# Patient Record
Sex: FEMALE | Race: BLACK OR AFRICAN AMERICAN | NOT HISPANIC OR LATINO | Employment: STUDENT | URBAN - METROPOLITAN AREA
[De-identification: names, ages, dates, MRNs, and addresses within clinical notes are randomized per-mention and may not be internally consistent; named-entity substitution may affect disease eponyms.]

---
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
## 2025-02-03 ENCOUNTER — OFFICE VISIT (OUTPATIENT)
Dept: FAMILY MEDICINE | Facility: CLINIC | Age: 20
End: 2025-02-03
Payer: COMMERCIAL

## 2025-02-03 VITALS
OXYGEN SATURATION: 99 % | TEMPERATURE: 96.9 F | HEART RATE: 72 BPM | HEIGHT: 68 IN | BODY MASS INDEX: 36.53 KG/M2 | RESPIRATION RATE: 16 BRPM | DIASTOLIC BLOOD PRESSURE: 78 MMHG | WEIGHT: 241 LBS | SYSTOLIC BLOOD PRESSURE: 118 MMHG

## 2025-02-03 DIAGNOSIS — R41.840 CONCENTRATION DEFICIT: Primary | ICD-10-CM

## 2025-02-03 LAB
ERYTHROCYTE [DISTWIDTH] IN BLOOD BY AUTOMATED COUNT: 12.6 % (ref 10–15)
HCT VFR BLD AUTO: 38.7 % (ref 35–47)
HGB BLD-MCNC: 13.2 G/DL (ref 11.7–15.7)
MCH RBC QN AUTO: 29.1 PG (ref 26.5–33)
MCHC RBC AUTO-ENTMCNC: 34.1 G/DL (ref 31.5–36.5)
MCV RBC AUTO: 85 FL (ref 78–100)
PLATELET # BLD AUTO: 282 10E3/UL (ref 150–450)
RBC # BLD AUTO: 4.54 10E6/UL (ref 3.8–5.2)
WBC # BLD AUTO: 5 10E3/UL (ref 4–11)

## 2025-02-03 PROCEDURE — 82728 ASSAY OF FERRITIN: CPT | Performed by: FAMILY MEDICINE

## 2025-02-03 PROCEDURE — 36415 COLL VENOUS BLD VENIPUNCTURE: CPT | Performed by: FAMILY MEDICINE

## 2025-02-03 PROCEDURE — 3074F SYST BP LT 130 MM HG: CPT | Performed by: FAMILY MEDICINE

## 2025-02-03 PROCEDURE — 82607 VITAMIN B-12: CPT | Performed by: FAMILY MEDICINE

## 2025-02-03 PROCEDURE — 3078F DIAST BP <80 MM HG: CPT | Performed by: FAMILY MEDICINE

## 2025-02-03 PROCEDURE — 85027 COMPLETE CBC AUTOMATED: CPT | Performed by: FAMILY MEDICINE

## 2025-02-03 PROCEDURE — 99204 OFFICE O/P NEW MOD 45 MIN: CPT | Performed by: FAMILY MEDICINE

## 2025-02-03 PROCEDURE — 84443 ASSAY THYROID STIM HORMONE: CPT | Performed by: FAMILY MEDICINE

## 2025-02-03 PROCEDURE — 1126F AMNT PAIN NOTED NONE PRSNT: CPT | Performed by: FAMILY MEDICINE

## 2025-02-03 PROCEDURE — 82306 VITAMIN D 25 HYDROXY: CPT | Performed by: FAMILY MEDICINE

## 2025-02-03 PROCEDURE — 80048 BASIC METABOLIC PNL TOTAL CA: CPT | Performed by: FAMILY MEDICINE

## 2025-02-03 ASSESSMENT — ANXIETY QUESTIONNAIRES
GAD7 TOTAL SCORE: 10
6. BECOMING EASILY ANNOYED OR IRRITABLE: SEVERAL DAYS
GAD7 TOTAL SCORE: 10
IF YOU CHECKED OFF ANY PROBLEMS ON THIS QUESTIONNAIRE, HOW DIFFICULT HAVE THESE PROBLEMS MADE IT FOR YOU TO DO YOUR WORK, TAKE CARE OF THINGS AT HOME, OR GET ALONG WITH OTHER PEOPLE: EXTREMELY DIFFICULT
5. BEING SO RESTLESS THAT IT IS HARD TO SIT STILL: NOT AT ALL
1. FEELING NERVOUS, ANXIOUS, OR ON EDGE: MORE THAN HALF THE DAYS
2. NOT BEING ABLE TO STOP OR CONTROL WORRYING: NEARLY EVERY DAY
7. FEELING AFRAID AS IF SOMETHING AWFUL MIGHT HAPPEN: NOT AT ALL
3. WORRYING TOO MUCH ABOUT DIFFERENT THINGS: NEARLY EVERY DAY

## 2025-02-03 ASSESSMENT — PATIENT HEALTH QUESTIONNAIRE - PHQ9
SUM OF ALL RESPONSES TO PHQ QUESTIONS 1-9: 5
5. POOR APPETITE OR OVEREATING: SEVERAL DAYS

## 2025-02-03 ASSESSMENT — PAIN SCALES - GENERAL: PAINLEVEL_OUTOF10: NO PAIN (0)

## 2025-02-03 NOTE — PROGRESS NOTES
"  Assessment & Plan     Concentration deficit  Patient is new to the practice.  She reports having difficulty with concentration for which reason she had to quit school.  She is working as a assistant to a nurse  - Adult Mental Health  Referral; Future  - CBC with platelets; Future  - TSH with free T4 reflex; Future  - Basic metabolic panel; Future  - Ferritin; Future  - Vitamin D Deficiency; Future  - Vitamin B12; Future  - CBC with platelets  - TSH with free T4 reflex  - Basic metabolic panel  - Ferritin  - Vitamin D Deficiency  - Vitamin B12          BMI  Estimated body mass index is 36.53 kg/m  as calculated from the following:    Height as of this encounter: 1.73 m (5' 8.11\").    Weight as of this encounter: 109.3 kg (241 lb).             Jacy Andrade is a 19 year old, presenting for the following health issues:  Referral (Psychiatrist )        2/3/2025     4:52 PM   Additional Questions   Roomed by Marie MCQUEEN     History of Present Illness       Reason for visit:  A referl   She is taking medications regularly.     Patient would like a referral to a psychiatrist for evaluation of ADHD.              Review of Systems  CONSTITUTIONAL: NEGATIVE for fever, chills, change in weight  ENT/MOUTH: NEGATIVE for ear, mouth and throat problems  RESP: NEGATIVE for significant cough or SOB  CV: NEGATIVE for chest pain, palpitations or peripheral edema      Objective    /78 (BP Location: Right arm, Patient Position: Sitting, Cuff Size: Adult Large)   Pulse 72   Temp 96.9  F (36.1  C) (Temporal)   Resp 16   Ht 1.73 m (5' 8.11\")   Wt 109.3 kg (241 lb)   LMP 01/31/2025 (Approximate)   SpO2 99%   BMI 36.53 kg/m    Body mass index is 36.53 kg/m .  Physical Exam   GENERAL: alert and no distress  RESP: lungs clear to auscultation - no rales, rhonchi or wheezes  CV: regular rate and rhythm, normal S1 S2, no S3 or S4, no murmur, click or rub, no peripheral edema  PSYCH: mentation appears normal, affect " normal/bright            Signed Electronically by: Brendan Zacarias MD  {Email feedback regarding this note to [email or web address]   :[id number]}

## 2025-02-04 LAB
ANION GAP SERPL CALCULATED.3IONS-SCNC: 15 MMOL/L (ref 7–15)
BUN SERPL-MCNC: 16.7 MG/DL (ref 6–20)
CALCIUM SERPL-MCNC: 9.6 MG/DL (ref 8.8–10.4)
CHLORIDE SERPL-SCNC: 104 MMOL/L (ref 98–107)
CREAT SERPL-MCNC: 0.55 MG/DL (ref 0.51–0.95)
EGFRCR SERPLBLD CKD-EPI 2021: >90 ML/MIN/1.73M2
FERRITIN SERPL-MCNC: 19 NG/ML (ref 6–175)
GLUCOSE SERPL-MCNC: 122 MG/DL (ref 70–99)
HCO3 SERPL-SCNC: 19 MMOL/L (ref 22–29)
POTASSIUM SERPL-SCNC: 3.9 MMOL/L (ref 3.4–5.3)
SODIUM SERPL-SCNC: 138 MMOL/L (ref 135–145)
TSH SERPL DL<=0.005 MIU/L-ACNC: 0.61 UIU/ML (ref 0.3–4.2)
VIT B12 SERPL-MCNC: 624 PG/ML (ref 232–1245)
VIT D+METAB SERPL-MCNC: 16 NG/ML (ref 20–50)

## 2025-02-18 ASSESSMENT — PATIENT HEALTH QUESTIONNAIRE - PHQ9
10. IF YOU CHECKED OFF ANY PROBLEMS, HOW DIFFICULT HAVE THESE PROBLEMS MADE IT FOR YOU TO DO YOUR WORK, TAKE CARE OF THINGS AT HOME, OR GET ALONG WITH OTHER PEOPLE: SOMEWHAT DIFFICULT
SUM OF ALL RESPONSES TO PHQ QUESTIONS 1-9: 11
SUM OF ALL RESPONSES TO PHQ QUESTIONS 1-9: 11

## 2025-02-18 ASSESSMENT — ANXIETY QUESTIONNAIRES
IF YOU CHECKED OFF ANY PROBLEMS ON THIS QUESTIONNAIRE, HOW DIFFICULT HAVE THESE PROBLEMS MADE IT FOR YOU TO DO YOUR WORK, TAKE CARE OF THINGS AT HOME, OR GET ALONG WITH OTHER PEOPLE: SOMEWHAT DIFFICULT
GAD7 TOTAL SCORE: 7
1. FEELING NERVOUS, ANXIOUS, OR ON EDGE: SEVERAL DAYS
GAD7 TOTAL SCORE: 7
5. BEING SO RESTLESS THAT IT IS HARD TO SIT STILL: NOT AT ALL
7. FEELING AFRAID AS IF SOMETHING AWFUL MIGHT HAPPEN: NOT AT ALL
GAD7 TOTAL SCORE: 7
7. FEELING AFRAID AS IF SOMETHING AWFUL MIGHT HAPPEN: NOT AT ALL
2. NOT BEING ABLE TO STOP OR CONTROL WORRYING: MORE THAN HALF THE DAYS
4. TROUBLE RELAXING: SEVERAL DAYS
8. IF YOU CHECKED OFF ANY PROBLEMS, HOW DIFFICULT HAVE THESE MADE IT FOR YOU TO DO YOUR WORK, TAKE CARE OF THINGS AT HOME, OR GET ALONG WITH OTHER PEOPLE?: SOMEWHAT DIFFICULT
6. BECOMING EASILY ANNOYED OR IRRITABLE: SEVERAL DAYS
3. WORRYING TOO MUCH ABOUT DIFFERENT THINGS: MORE THAN HALF THE DAYS

## 2025-02-19 ENCOUNTER — VIRTUAL VISIT (OUTPATIENT)
Dept: PSYCHOLOGY | Facility: CLINIC | Age: 20
End: 2025-02-19
Payer: COMMERCIAL

## 2025-02-19 ENCOUNTER — FCC EXTENDED DOCUMENTATION (OUTPATIENT)
Dept: PSYCHOLOGY | Facility: CLINIC | Age: 20
End: 2025-02-19
Payer: COMMERCIAL

## 2025-02-19 DIAGNOSIS — R41.840 INATTENTION: Primary | ICD-10-CM

## 2025-02-19 DIAGNOSIS — R41.840 CONCENTRATION DEFICIT: ICD-10-CM

## 2025-02-19 PROCEDURE — 90837 PSYTX W PT 60 MINUTES: CPT | Mod: 95 | Performed by: PSYCHOLOGIST

## 2025-02-19 ASSESSMENT — COLUMBIA-SUICIDE SEVERITY RATING SCALE - C-SSRS
TOTAL  NUMBER OF ABORTED OR SELF INTERRUPTED ATTEMPTS LIFETIME: NO
1. HAVE YOU WISHED YOU WERE DEAD OR WISHED YOU COULD GO TO SLEEP AND NOT WAKE UP?: YES
1. IN THE PAST MONTH, HAVE YOU WISHED YOU WERE DEAD OR WISHED YOU COULD GO TO SLEEP AND NOT WAKE UP?: NO
ATTEMPT LIFETIME: NO
2. HAVE YOU ACTUALLY HAD ANY THOUGHTS OF KILLING YOURSELF?: NO
6. HAVE YOU EVER DONE ANYTHING, STARTED TO DO ANYTHING, OR PREPARED TO DO ANYTHING TO END YOUR LIFE?: NO
REASONS FOR IDEATION LIFETIME: COMPLETELY TO END OR STOP THE PAIN (YOU COULDN'T GO ON LIVING WITH THE PAIN OR HOW YOU WERE FEELING)
TOTAL  NUMBER OF INTERRUPTED ATTEMPTS LIFETIME: NO

## 2025-02-19 NOTE — PROGRESS NOTES
[facility]   Mental Health & Addiction Services     Progress Note - Initial Visit    Patient  Name:  Lupe Pham Date: 2025         Service Type: Individual     Visit Start Time: 9:00am  Visit End Time: 9:53am    Visit #: 1    Attendees: Client attended alone    Service Modality:  Video Visit:      Provider verified identity through the following two step process.  Patient provided:  Patient  and Patient address    Telemedicine Visit: The patient's condition can be safely assessed and treated via synchronous audio and visual telemedicine encounter.      Reason for Telemedicine Visit: Patient has requested telehealth visit    Originating Site (Patient Location): Patient's other study space.    Distant Site (Provider Location): [facility]    Consent:  The patient/guardian has verbally consented to: the potential risks and benefits of telemedicine (video visit) versus in person care; bill my insurance or make self-payment for services provided; and responsibility for payment of non-covered services.     Patient would like the video invitation sent by:  My Chart    Mode of Communication:  Video Conference via [facility]    Distant Location (Provider):  Off-site    As the provider I attest to compliance with applicable laws and regulations related to telemedicine.       DATA:   Interactive Complexity: No   Crisis: No  Extended Session (53+ minutes):     - Patient's presenting concerns require more intensive intervention than could be completed within the usual service     Presenting Concerns/  Current Stressors:   Began DA for ADHD Evaluation.      ASSESSMENT:  Mental Status Assessment:  Appearance:   Appropriate   Eye Contact:   Good   Psychomotor Behavior: Normal   Attitude:   Cooperative  Interested Friendly Pleasant  Orientation:   All  Speech   Rate / Production: Normal/ Responsive Talkative   Volume:  Normal   Mood:    Normal  Affect:    Appropriate    Thought Content:  Clear   Thought Form:  Coherent  Logical   Insight:    Good     Assessments completed prior to this visit:  The following assessments were completed by patient for this visit:  PHQ9:       2/3/2025     5:24 PM 2/18/2025     9:48 AM   PHQ-9 SCORE   PHQ-9 Total Score MyChart  11 (Moderate depression)   PHQ-9 Total Score 5 11        Patient-reported     GAD7:       2/3/2025     5:24 PM 2/18/2025     9:49 AM   FAIZAN-7 SCORE   Total Score  7 (mild anxiety)   Total Score 10 7        Patient-reported     CAGE-AID:       2/15/2025     3:00 AM   CAGE-AID Total Score   Total Score 0    Total Score MyChart 0 (A total score of 2 or greater is considered clinically significant)       Patient-reported     PROMIS 10-Global Health (all questions and answers displayed):       2/15/2025     3:00 AM   PROMIS 10   In general, would you say your health is: Good   In general, would you say your quality of life is: Very good   In general, how would you rate your physical health? Very good   In general, how would you rate your mental health, including your mood and your ability to think? Fair   In general, how would you rate your satisfaction with your social activities and relationships? Excellent   In general, please rate how well you carry out your usual social activities and roles Good   To what extent are you able to carry out your everyday physical activities such as walking, climbing stairs, carrying groceries, or moving a chair? Completely   In the past 7 days, how often have you been bothered by emotional problems such as feeling anxious, depressed, or irritable? Often   In the past 7 days, how would you rate your fatigue on average? None   In the past 7 days, how would you rate your pain on average, where 0 means no pain, and 10 means worst imaginable pain? 0   In general, would you say your health is: 3   In general, would you say your quality of life is: 4   In general, how would you rate your physical health?  4   In general, how would you rate your mental health, including your mood and your ability to think? 2   In general, how would you rate your satisfaction with your social activities and relationships? 5   In general, please rate how well you carry out your usual social activities and roles. (This includes activities at home, at work and in your community, and responsibilities as a parent, child, spouse, employee, friend, etc.) 3   To what extent are you able to carry out your everyday physical activities such as walking, climbing stairs, carrying groceries, or moving a chair? 5   In the past 7 days, how often have you been bothered by emotional problems such as feeling anxious, depressed, or irritable? 4   In the past 7 days, how would you rate your fatigue on average? 1   In the past 7 days, how would you rate your pain on average, where 0 means no pain, and 10 means worst imaginable pain? 0   Global Mental Health Score 13    Global Physical Health Score 19    PROMIS TOTAL - SUBSCORES 32        Patient-reported         Safety Issues and Plan for Safety and Risk Management:   [place] Suicide Severity Rating Scale (Lifetime/Recent)      2/19/2025     9:32 AM   [place] Suicide Severity Rating (Lifetime/Recent)   1. Wish to be Dead (Lifetime) Y   Wish to be Dead Description (Lifetime) During sophomore year in high school during [facility].   1. Wish to be Dead (Past 1 Month) N   2. Non-Specific Active Suicidal Thoughts (Lifetime) N   Most Severe Ideation Rating (Lifetime) 1   Frequency (Lifetime) 2   Duration (Lifetime) 4   Controllability (Lifetime) 1   Deterrents (Lifetime) 1   Reasons for Ideation (Lifetime) 5   Actual Attempt (Lifetime) N   Has subject engaged in non-suicidal self-injurious behavior? (Lifetime) N   Interrupted Attempts (Lifetime) N   Aborted or Self-Interrupted Attempt (Lifetime) N   Preparatory Acts or Behavior (Lifetime) N   Calculated C-SSRS Risk Score (Lifetime/Recent) No Risk Indicated      Patient denies current fears or concerns for personal safety.  Patient denies current or recent suicidal ideation or behaviors.  Patient denies current or recent homicidal ideation or behaviors.  Patient denies current or recent self injurious behavior or ideation.  Patient denies other safety concerns.  Recommended that patient call 911 or go to the local ED should there be a change in any of these risk factors  Patient reports there are no firearms in the house.       DSM5 Diagnoses:   Diagnoses: R41.840 Inattention  Psychosocial & Contextual Factors: None  Intervention:   CBT: positive reinforcement, behavior modification  Emotion Focused Therapy: emotion checking  Motivational Interviewing: open ended questions  Collateral Reports Completed:  Routed note to PCP      PLAN: (Homework, other):  1. Provider will continue Diagnostic Assessment.  Patient was given the following to do until next session:  Complete symptom checklists.    2. Provider recommended the following referrals: None.      3.  Suicide Risk and Safety Concerns were assessed for Lupe Pham.    Patient meets the following risk assessment and triage: Patient denied any current/recent/lifetime history of suicidal ideation and/or behaviors.  No safety plan indicated at this time.       Jyoti Fagan, PhD  February 19, 2025

## 2025-02-19 NOTE — Clinical Note
Hello,  I saw this patient for an ADHD assessment. I will forward you my report and findings upon completion. Please let me know if you have any questions or concerns.  Jyoti Fagan, PhD,  Clinical Psychologist

## 2025-02-26 ENCOUNTER — VIRTUAL VISIT (OUTPATIENT)
Dept: PSYCHOLOGY | Facility: CLINIC | Age: 20
End: 2025-02-26
Payer: COMMERCIAL

## 2025-02-26 DIAGNOSIS — R41.840 INATTENTION: Primary | ICD-10-CM

## 2025-02-26 PROCEDURE — 90791 PSYCH DIAGNOSTIC EVALUATION: CPT | Mod: 95 | Performed by: PSYCHOLOGIST

## 2025-02-26 NOTE — PROGRESS NOTES
[facility]         PATIENT'S NAME: Lupe Pham  PREFERRED NAME: Lupe  PRONOUNS: she/her  MRN: [id number]  : 2005  ADDRESS: [address]  [address]  [place] [address]  ACCT. NUMBER:  [id number]  DATE OF SERVICE: 25  START TIME: 8:00am  END TIME: 8:17am  PREFERRED PHONE: [phone]  May we leave a program related message: Yes  EMERGENCY CONTACT: was obtained [phone] (sister) Mame.  SERVICE MODALITY:  Video Visit:      Provider verified identity through the following two step process.  Patient provided:  Patient  and Patient address    Telemedicine Visit: The patient's condition can be safely assessed and treated via synchronous audio and visual telemedicine encounter.      Reason for Telemedicine Visit: Patient has requested telehealth visit    Originating Site (Patient Location): Patient's other study area at cousin's apartment.    Distant Site (Provider Location): [facility]    Consent:  The patient/guardian has verbally consented to: the potential risks and benefits of telemedicine (video visit) versus in person care; bill my insurance or make self-payment for services provided; and responsibility for payment of non-covered services.     Patient would like the video invitation sent by:  My Chart    Mode of Communication:  Video Conference via Amwell    Distant Location (Provider):  Off-site    As the provider I attest to compliance with applicable laws and regulations related to telemedicine.    UNIVERSAL ADULT Mental Health DIAGNOSTIC ASSESSMENT    Identifying Information:  Patient is a 19 year old, Green[place]ic individual.  Patient was referred for an assessment by referring provider.  Patient attended the session alone.    Chief Complaint:   The purpose of this evaluation is to: evaluate current cognitive functioning, provide treatment recommendations, and clarify diagnosis. Patient reported seeking services at this time  for diagnostic assessment and recommendations for treatment.  Patient reported that  she has not completed a previous ADHD diagnostic assessment.  Patient has not received a previous diagnosis of ADHD. Patient reported that medication has not been prescribed medication to address these problems.     Feeling like this since a little kid, didn't really know about ADHD or that was a diagnosis I could get. When I did find out about it in high school, didn't really think it was a big deal until college. Hard to focus on a book, shows. As a kid always distracted in class and not able to focus. Mom is an immigrant so didn't really know about it. In college, course loads are harder and difficult to get extensions. When doing an assignment, switch to a different assignment. Something always running in head and not able to focus and finish it. Able to make it through in middle/high school but demand is so much more in college and not able to manage.    Social/Family History:  Patient reported they grew up in [place].  They were raised by biological mother, dad living in  after divorce so talked over phone, now living in [place] and both physically and emotionally present.  Parents  when patient age 2. Relationship with mom is reported to be pretty good, not as good when younger; relationship with dad is pretty good. Two full siblings (older sister and younger brother) and 9 paternal half siblings (only relationship with 1). Relationship with full siblings is pretty good. Patient reported that their childhood was pretty fun, hung out with cousins and family a lot.      Patient described her childhood family environment as nurturing and stable and having mild to moderate level of chaos because mom was a single mom and would stay with aunts when mom was working.  As a child, patient reported that she failed to complete assigned chores in the home environment, had problems with organization and keeping track of items,  "misplaced or lost things, needed frequent reminders by parents to be motivated or to complete work, had problems managing temper with frequent emotional outbursts, and had difficulty managing personal hygiene. Patient reported no difficulty with childhood peer relationships.     The patient describes their cultural background as Mongolian.  Cultural influences and impact on patient's life structure, values, norms, and healthcare: I grew up Restorationism and wore the hijab all my life, but i havent really been effected by the way people react ot their comments.  Patient identified their preferred language to be English. Patient reported they does not need the assistance of an  or other support involved in therapy.     Patient reported had no significant delays in developmental tasks.   Patient's highest education level was high school graduate. Patient graduated high school in 2023 with a 2.8 GPA. Started college at [employer] working on [employer], completed 3 semesters, taking a break this semester due to attention difficulties.  Patient identified the following learning problems: attention and concentration.  Modifications will not be used to assist communication in therapy.  Patient reports they are able to understand written materials. Patient did receive tutoring services during the school years to help get homework done and math. Patient did not receive special education services. Patient reported failure to finish or complete homework. During the elementary, middle, and high school years, patient recalls academic strengths in the area of reading, math, physical education, athletics, social studies, and \"hands on\" activities. Patient reported experiencing academic problems in reading, writing, math, science, and test taking.     Homework: Always hard, during elementary school didn't get much homework, talked to friends instead of doing work. Middle school didn't really care as much, when did " sit down and work on it, it was really hard to get it done. In high school, needed sister to sit next to her to get it done. High school was very hard, needed friends and sister to help get it done and stay focused. Only assignments I could get done was busy work. Discussion posts and actual assignments were really hard to finish. Thinking about a different assignment as working on one, jumped back and forth between assignments. Very difficult to focus.  Transition to College: Not bad in beginning because tried to hold self accountable, go to the library, but only lasted a week. Feel overwhelmed by all the assignments, everything is running in head. Know need to do things to get forward in life but not able to get anything done.  Studying: Don't even do it, never able to actually study, very hard to go through things and review. Can't focus on going through the whole course load and do poorly on finals.  Attention: In class is OK because teacher is in front of me (in person classes), online classes is really bad because cannot focus on homework or watching videos, can't take notes on videos because too many things running in head.  Attendance: Fine  Peers: Making friends was OK, relationships are good now, able to communicate with friends.  Behavioral (suspended? Expelled?): No  Transferred schools?: In elementary school (5th grade), left [email or web address] and went to a [employer] in [place], after half a semester moved back to [email or web address]. Curriculum in [employer] was a bit behind so moved back.   Difficulty with grocery lists?: Try, if don't make a list will forget everything I need, try to sit in car and think about what supposed to buy. Will also buy things I don't need.  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  Patient reports their finances are  obtained through employment. The patient's work history includes: [employer], [employer], [employer], [employer].  The longest period of employment has been [employer].  Client has not been terminated from a place of employment.  Patient does identify finances as a current stressor.  Client reported that the current job is a good fit for her skills and personality.  Client reported that she displays some distractible behavior.     Patient reported having sleep disturbance, including: snoring and teeth grinding  during childhood. Patient reported currently experiencing sleep disturbance, including: daytime drowsiness / fatigue and snoring.  Client reported sleeping approximately 6-7 hours per night, feel rested if go to sleep at a decent time, currently working on better sleep schedule.  Patient reported that she has not completed a sleep study.  Patient reported having a well balanced diet, an inconsistent diet, and cravings for sweets.  There are not significant nutritional concerns.  Patient reported engaging in regular exercise.    Patient reported the following relationship history: never dated.  Patient's current relationship status is single for whole life.   Patient identified their sexual orientation as heterosexual.  Patient reported having 0 child(reji). Patient identified parents; siblings; friends as part of their support system.  Patient identified the quality of these relationships as good.      Patient's current living/housing situation involves staying at home with mom and 2 siblings. They report that housing is stable.    Patient reported that they have not been involved with the legal system.  Patient does not report being under probation/ parole/ jurisdiction.     Patient has received a 's license.  Patient has not received any moving violations.  Patient reported the following driving habits: attentive and cautious, experiences road rage, and often exceeds the speed limit / speeds.  According to  client, other people are comfortable riding as passengers when she is driving.     Patient's Strengths and Limitations:  Patient identified the following strengths or resources that will help them succeed in treatment: Buddhist / Moravian, commitment to health and well being, community involvement, exercise routine, emperatriz / spirituality, friends / good social support, family support, insight, intelligence, positive work environment, motivation, sense of humor, strong social skills, and work ethic. Things that may interfere with the patient's success in treatment include: none identified.     Assessments:  The following assessments were completed by patient for this visit:  PHQ9:       2/3/2025     5:24 PM 2/18/2025     9:48 AM   PHQ-9 SCORE   PHQ-9 Total Score MyChart  11 (Moderate depression)   PHQ-9 Total Score 5 11        Patient-reported     GAD7:       2/3/2025     5:24 PM 2/18/2025     9:49 AM   FAIZAN-7 SCORE   Total Score  7 (mild anxiety)   Total Score 10 7        Patient-reported     CAGE-AID:       2/15/2025     3:00 AM   CAGE-AID Total Score   Total Score 0    Total Score MyChart 0 (A total score of 2 or greater is considered clinically significant)       Patient-reported     PROMIS 10-Global Health (all questions and answers displayed):       2/15/2025     3:00 AM 2/22/2025     8:06 AM   PROMIS 10   In general, would you say your health is: Good Very good   In general, would you say your quality of life is: Very good Excellent   In general, how would you rate your physical health? Very good Very good   In general, how would you rate your mental health, including your mood and your ability to think? Fair Good   In general, how would you rate your satisfaction with your social activities and relationships? Excellent Excellent   In general, please rate how well you carry out your usual social activities and roles Good Good   To what extent are you able to carry out your everyday physical activities such as  walking, climbing stairs, carrying groceries, or moving a chair? Completely Completely   In the past 7 days, how often have you been bothered by emotional problems such as feeling anxious, depressed, or irritable? Often Sometimes   In the past 7 days, how would you rate your fatigue on average? None None   In the past 7 days, how would you rate your pain on average, where 0 means no pain, and 10 means worst imaginable pain? 0 0   In general, would you say your health is: 3 4   In general, would you say your quality of life is: 4 5   In general, how would you rate your physical health? 4 4   In general, how would you rate your mental health, including your mood and your ability to think? 2 3   In general, how would you rate your satisfaction with your social activities and relationships? 5 5   In general, please rate how well you carry out your usual social activities and roles. (This includes activities at home, at work and in your community, and responsibilities as a parent, child, spouse, employee, friend, etc.) 3 3   To what extent are you able to carry out your everyday physical activities such as walking, climbing stairs, carrying groceries, or moving a chair? 5 5   In the past 7 days, how often have you been bothered by emotional problems such as feeling anxious, depressed, or irritable? 4 3   In the past 7 days, how would you rate your fatigue on average? 1 1   In the past 7 days, how would you rate your pain on average, where 0 means no pain, and 10 means worst imaginable pain? 0 0   Global Mental Health Score 13  16    Global Physical Health Score 19  19    PROMIS TOTAL - SUBSCORES 32  35        Patient-reported     Tift Suicide Severity Rating Scale (Lifetime/Recent)      2/19/2025     9:32 AM   Tift Suicide Severity Rating (Lifetime/Recent)   1. Wish to be Dead (Lifetime) Y   Wish to be Dead Description (Lifetime) During sophomore year in high school during COVID.   1. Wish to be Dead (Past 1  Month) N   2. Non-Specific Active Suicidal Thoughts (Lifetime) N   Most Severe Ideation Rating (Lifetime) 1   Frequency (Lifetime) 2   Duration (Lifetime) 4   Controllability (Lifetime) 1   Deterrents (Lifetime) 1   Reasons for Ideation (Lifetime) 5   Actual Attempt (Lifetime) N   Has subject engaged in non-suicidal self-injurious behavior? (Lifetime) N   Interrupted Attempts (Lifetime) N   Aborted or Self-Interrupted Attempt (Lifetime) N   Preparatory Acts or Behavior (Lifetime) N   Calculated C-SSRS Risk Score (Lifetime/Recent) No Risk Indicated       Personal and Family Medical History:  Patient does not report a family history of mental health concerns.  Patient reports family history is not on file.    Patient does not report Mental Health Diagnosis or Treatment.      Patient has had a physical exam to rule out medical causes for current symptoms.  Date of last physical exam was within the past year. Client was encouraged to follow up with PCP if symptoms were to develop. The patient has a [facility] Provider, who is named Brendan Zacarias.  Patient reports no current medical concerns.  Patient denies any issues with pain.   There are not significant appetite / nutritional concerns / weight changes.   Patient does not report a history of head injury / trauma / cognitive impairment.     Patient reports not taking any current medications    Patient Allergies:  No Known Allergies    Medical History:  No past medical history on file.      Current Mental Status Exam:   Appearance:  Appropriate    Eye Contact:  Good   Psychomotor:  Normal       Gait / station:  no problem  Attitude / Demeanor: Cooperative  Interested Friendly Pleasant  Speech      Rate / Production: Normal/ Responsive      Volume:  Normal  volume      Language:  intact  Mood:   Normal  Affect:   Appropriate    Thought Content: Clear   Thought Process: Coherent  Logical       Associations: No loosening of associations  Insight:   Good    Judgment:  Intact   Orientation:  All  Attention/concentration: Good    Substance Use:   Patient did not report a family history of substance use concerns.  Patient has not received chemical dependency treatment in the past.  Patient has not ever been to detox.          Substance History of use Age of first use Date of last use     Pattern and duration of use (include amounts and frequency)   Alcohol never used       REPORTS SUBSTANCE USE: N/A   Cannabis   never used     REPORTS SUBSTANCE USE: N/A     Amphetamines   never used     REPORTS SUBSTANCE USE: N/A   Cocaine/crack    never used       REPORTS SUBSTANCE USE: N/A   Hallucinogens never used         REPORTS SUBSTANCE USE: N/A   Inhalants never used         REPORTS SUBSTANCE USE: N/A   Heroin never used         REPORTS SUBSTANCE USE: N/A   Other Opiates never used     REPORTS SUBSTANCE USE: N/A   Benzodiazepine   never used     REPORTS SUBSTANCE USE: N/A   Barbiturates never used     REPORTS SUBSTANCE USE: N/A   Over the counter meds never used     REPORTS SUBSTANCE USE: N/A   Caffeine currently use 18   REPORTS SUBSTANCE USE: reports using substance 1 times per week and has 1 cup coffee at a time.   Patient reports heaviest use was Nov 2024 drinking energy drinks nearly every day.   Nicotine  never used     REPORTS SUBSTANCE USE: N/A   Other substances not listed above:  Identify:  never used     REPORTS SUBSTANCE USE: N/A     Patient reported the following problems as a result of their substance use: no problems, not applicable.     Substance Use: No symptoms    Based on the CAGE score of 0 and clinical interview there  are not indications of drug or alcohol abuse.    Significant Losses / Trauma / Abuse / Neglect Issues:   Patient did not serve in the .  There are not indications or report of significant loss, trauma, abuse or neglect issues.  Patient has not been a victim of exploitation.  Concerns for possible neglect are not present.     Safety  Assessment:   Patient denies current or past homicidal ideation and behaviors.  Patient denies current/recent suicide ideation, plans, intent, or attempts but reports a history of SI briefly during [facility].  Patient denies current or past self-injurious behaviors.  Patient denied risk behaviors associated with substance use.  Patient denies any high risk behaviors associated with mental health symptoms.  Patient denied current or past personal safety concerns.    Patient denies past of current/recent assaultive behaviors.    Patient denied a history of sexual assault behaviors.     Patient reports there are not firearms in the house.    Patient reports the following protective factors:  forward or future oriented thinking; dedication to family or friends; safe and stable environment; effectively controls impulses; effective problem solving skills; sense of meaning; positive social skills; healthy fear of risky behaviors or pain; strong sense of self worth or esteem; sense of personal control or determination    Risk Plan:  See Recommendations for Safety and Risk Management Plan    Review of Symptoms per patient report:   Depression: Feelings of hopelessness, Change in sleep, Difficulties concentrating, Irritability, and Withdrawn  [place]:  Irritability and Distractibility  Psychosis: No Symptoms  Anxiety: Excessive worry, Nervousness, Poor concentration, and Irritability  Panic:  No symptoms  Post Traumatic Stress Disorder:  No Symptoms   Eating Disorder: No Symptoms  ADD / ADHD:  Inattentive, Poor task completion, Poor organizational skills, Distractibility, Forgetful, Interrupts, Restlessness/fidgety, Hyperverbal, and Hyperactive  Conduct Disorder: No symptoms  Autism Spectrum Disorder: No symptoms  Obsessive Compulsive Disorder: No Symptoms  Personality Disorders:  No Symptoms    Patient reports the following compulsive behaviors and treatment history: None.      Functional Status:  Patient reports the  following functional impairments:  academic performance, health maintenance, management of the household and or completion of tasks, money management, organization, relationship(s), and social interactions.     Nonprogrammatic care:  Patient is requesting basic services to address current mental health concerns.    Clinical Summary:  1. Psychosocial Factors:  None.  Cultural and Contextual Factors: None  2. Principal DSM5 Diagnoses  (Sustained by DSM5 Criteria Listed Above):   R41.840 Inattention  3. Other Diagnoses that is relevant to services:   None.  4. Provisional Diagnosis:  None.  5. Prognosis: Expect Improvement.  6. Likely consequences of symptoms if not treated: No improvement or worsening of symptoms.  7. Patient strengths include:  educated, insightful, intelligent, motivated, open to learning, and support of family, friends and providers.     Recommendations:     1. Plan for Safety and Risk Management:   Safety and Risk: Recommended that patient call 911 or go to the local ED should there be a change in any of these risk factors        Report to child / adult protection services was NA.     2. Patient's identified No concerns with cultural influence to be addressed in treatment.     3. Initial Treatment will focus on:    ADHD Testing:  Patient was given self and collaborative rating scales, MMPI, CNS to be completed prior to the next appointment.  Depression and anxiety rating scales were completed.  Copies of  no records  were requested.      4. Resources/Service Plan:    services are not indicated.   Modifications to assist communication are not indicated.   Additional disability accommodations are not indicated.      5. Collaboration:   Collaboration / coordination of treatment will be initiated with the following  support professionals: primary care physician.      6.  Referrals:   The following referral(s) will be initiated: None.       A Release of Information has been obtained for the  following: None.     Clinical Substantiation/medical necessity for the above recommendations:  Significant symptoms of ADHD.    7. CRUZITO: N/A     8. Records:   These were reviewed at time of assessment.   Information in this assessment was obtained from the medical record and  provided by patient who is a good historian.    Patient will have open access to their mental health medical record.    9.   Interactive Complexity: No    10. Safety Plan: No Safety plan indicated    Provider Name/ Credentials:  Jyoti Fagan, PhD LP  February 26, 2025

## 2025-03-02 ENCOUNTER — HEALTH MAINTENANCE LETTER (OUTPATIENT)
Age: 20
End: 2025-03-02

## 2025-04-07 NOTE — PROGRESS NOTES
[facility]    Psychological Report of ADHD Evaluation    PATIENT'S NAME: Lupe Pham  MRN: [id number]  ACCT. NUMBER:  [id number]  DATE OF SERVICE: 4/14/25  SERVICE MODALITY:  Video Visit:      Date(s) of assessment:   Diagnostic Assessment 2/19/2025, 2/26/2025  Zehra self-report and collateral measures scored and interpreted 4/7/2025  MMPI 4/7/2025  CNS 4/7/2025    Information about appointment:  Patient attended two  sessions to aid in determining patient's mental health diagnosis or diagnoses and treatment recommendations that best address patient concerns. Patient records including medical were reviewed. A diagnostic assessment was conducted at the initial appointment. Patient completed several rating scales to assist in assessing attention-related and other mental health symptoms that may be causing impairments in functioning. Rating scales were also completed by a collateral contact.    Assessment tools:   Some measures may have been completed remotely due to virtual care, in which case observation of task completion was not possible.    Zehra Adult ADHD Rating Scale-IV: Self and Other Reports (BAARS-IV), Zehra Functional Impairment Scale: Self and Other Reports (BFIS), Zehra Deficits in Executive Functioning Scale: Self and Other Reports (BDEFS), Patient Health Questionnaire-9 (PHQ-9), Generalized Anxiety Disorder-7 (FAIZAN-7), [place] Multiphasic Personality Inventory (MMPI), CNS Vital Signs Neurocognitive Battery, and [place] Sleepiness Scale.    Assessment Results:    Zehra Adult ADHD Rating Scale-IV: Self and Other Reports (BAARS-IV)  The BAARS-IV assesses for symptoms of ADHD that are experienced in one's daily life. This assessment measure includes self and collateral rating scales designed to provide information regarding current and childhood symptoms of ADHD including inattention, hyperactivity, and impulsivity. Self-report scores are reported as percentiles. Scores at the  "76th-83rd percentile are considered marginal, scores at the 84th-92nd percentile are considered borderline, scores at the 93rd-95th percentile are considered mild, scores at the 96th-98th percentile are considered moderate, and those at the 99th percentile are considered severe. Collateral or \"other\" rating scales are reported as number of symptoms observed in comparison to those reported by the patient. Norms and percentile scores are not available for collateral reports.     Current Symptoms Scale--Self Report:   Patient completed the self-report inventory of current symptoms. The results indicate that the patient's Total ADHD Score was 55 which places her in the 99+ percentile for overall ADHD symptoms. In addition, the patient endorsed 8/9 (98th percentile) Inattention symptoms, 4/9 (95th percentile) Hyperactivity-Impulsivity symptoms, and 2/9 (85th percentile) Sluggish Cognitive Tempo symptoms. Patient indicated that the reported symptoms have resulted in impaired functioning in school, home, and social relationships. Overall, the results suggest the patient is expeiencing Severe ADHD symptoms.     Current Symptoms Scale--Other Report:  Patient's sister completed the collateral report inventory of current symptoms. Based on the collateral contact's observation of symptoms, the patient demonstrates 6/9 Inattention symptoms, 4/9 Hyperactivity-Impulsivity symptoms, and 2/9 Sluggish Cognitive Tempo symptoms. The patient's Total ADHD Score was 49. The collateral contact indicated the patient demonstrates impaired functioning in school, home, and social relationships. The collateral- and self-report scores are not significantly different.     Childhood Symptoms Scale--Self-Report:  Patient completed the self-report inventory of childhood symptoms. The results indicate that the patient's Total ADHD Score was 30 which places her in the 98th percentile for overall ADHD symptoms in childhood. In addition, the patient " "endorsed 7/9 (96th percentile) Inattention symptoms and  5/9 (93rd percentile) Hyperactivity-Impulsivity symptoms. Patient indicated that the reported symptoms resulted in impaired functioning in school, home, and social relationships. Overall, the results suggest the patient experienced  Mild symptoms of ADHD as a child.     Childhood Symptoms Scale--Other Report:  Patient's sister completed the collateral report inventory of childhood symptoms. Based on the collateral contact's recollection of patient's childhood symptoms, the patient demonstrated 8/9 Inattention symptoms and 6/9 Hyperactivity-Impulsivity symptoms. The patient's Total ADHD Score was 62. The collateral contact indicated the patient demonstrates impaired functioning in school, home, and social relationships. The collateral- and self-report scores are not significantly different.                           Zehra Functional Impairment Scale: Self and Other Reports (BFIS)  The BFIS is used to assess the level of impairment in major life/daily activities that may be due to mental health symptoms. This assessment measure includes self and collateral rating scales. Self-report scores are reported as percentiles. Scores at the 76th-83rd percentile are considered marginal, scores at the 84th-92nd percentile are considered borderline, scores at the 93rd-95th percentile are considered mild, scores at the 96th-98th percentile are considered moderate, and those at the 99th percentile are considered severe.Collateral or \"other\" rating scales are reported as number of symptoms observed in comparison to those reported by the patient. Norms and percentile scores are not available for collateral reports.     Results indicate the patient identified impairment (scores at or greater than 93rd percentile) in the following areas: home-chores, community activities, education, and daily responsibilities The patient's Mean Impairment Score was 5.3 (92nd percentile) " "indicating the patient is reporting Borderline impairment in functioning across domains. Patient's sister completed the collateral rating scale, which indicated discrepant results. The collateral contact's scores were generally lower than the patient's report.     Zehra Deficits in Executive Functioning Scale (BDEFS)  The BDEFS is a measure used for evaluating adult executive functioning in daily activities.This assessment measure includes self and collateral rating scales. Self-report scores are reported as percentiles. Scores at the 76th-83rd percentile are considered marginal, scores at the 84th-92nd percentile are considered borderline, scores at the 93rd-95th percentile are considered mild, scores at the 96th-98th percentile are considered moderate, and those at the 99th percentile are considered severe.Collateral or \"other\" rating scales are reported as number of symptoms observed in comparison to those reported by the patient. Norms and percentile scores are not available for collateral reports.     Results indicate the patient's Total Executive Functioning Score was 221 (96th percentile). The ADHD-Executive Functioning Index score was 30 (97th percentile). These scores suggest the patient has Moderate deficits in executive functioning. These deficits are likely to be due to ADHD. Results indicate the patient identified significant deficits in the following areas: self-management to time (99+ percentile) , self-organization/problem-solving (99+ percentile), and self-motivation (95th percentile) . Patient's sister completed the collateral rating scale, which indicated similar results. The collateral contact's scores were generally the same as the patient's report with the exception of self-motivation (sister not seeing any impairment).    CNS Vital Signs Neurocognitive Battery  The CNS Vital Signs Neurocognitive Battery is a remotely-administered assessment comprised of seven core subtests to individually " measure the patient's verbal memory, visual memory, motor speed, psychomotor speed, reaction time, focus, ability to sustain attention and ability to adapt to changing rules and tasks.      Above average domain scores indicate a standard score (SS) greater than 109 or a Percentile Rank (MA) greater than 74, indicating a high functioning test subject. Average is a SS  or MA 25-74, indicating normal function. Low Average is a SS 80-89 or MA 9-24 indicating a slight deficit or impairment. Below Average is a SS 70-79 or MA 2-8, indicating a moderate level of deficit or impairment. Very Low is a SS less than 70 or a MA less than 2, indicating a deficit and impairment.  Validity Indicator denotes a guideline for representing the possibility of an invalid test or domain score, and can be influenced by patient understanding, effort, or other conditions.    The patient's results are detailed below:    Domain Standard Score Percentile Description Validity   Neurocognitive Index 75 5 Low Yes   Composite Memory Measure 55 1 Very Low Yes   Verbal Memory 61 1 Very Low Yes   Visual Memory 66 1 Very Low Yes   Psychomotor Speed 88 21 Low Average Yes   Reaction Time 61 1 Very Low Yes   Complex Attention 89 23 Low Average Yes   Cognitive Flexibility 83 13 Low Average Yes   Processing Speed 90 25 Average Yes   Executive Function 87 19 Low Average Yes   Reasoning 84 14 Low Average Yes   Working Memory 88 21 Low Average Yes   Sustained Attention  96 40 Average Yes   Simple Attention 85 16 Low Average Yes   Motor Speed 93 32 Average Yes     Neurocognitive Index (NCI): Measures an average score derived from the domain scores or a general assessment of the overall neurocognitive status of the patient. The patient's NCI score is 75, with a percentile of 5, and falls within the Low range.    Composite Memory: Measures how well subject can recognize, remember, and retrieve words and geometric figures, and is comprised of the Visual and  Verbal Memory domains. The patient's Composite Memory score is 55, with a percentile of 1, and falls within the Very Low range.    Verbal Memory: Measures how well subject can recognize, remember, and retrieve words. The patient's Verbal Memory score is 61, with a percentile of 1, and falls within the Very Low range.    Visual Memory: Measures how well subject can recognize, remember and retrieve geometric figures. The patient's Visual Memory score is 66, with a percentile of 1, and falls within the Very Low range.    Psychomotor Speed: Measures how well a subject perceives, attends, responds to complex visual-perceptual information and performs simple fine motor coordination, and is comprised of the Motor Speed and Processing Speed indexes. The patient's Psychomotor Speed score is 88, with a percentile of 21, and falls within the Low Average range.    Reaction Time: Measures how quickly the subject can react, in milliseconds, to a simple and increasingly complex direction set. The patient's Reaction Time score is 61, with a percentile of 1, and falls within the Very Low range.    Complex Attention: Measures the ability to track and respond to a variety of stimuli over lengthy periods of time and/or perform complex mental tasks requiring vigilance quickly and accurately. The patient's Complex Attention score is 89, with a percentile of 23, and falls within the Low Average range.    Cognitive Flexibility: Measures how well subject is able to adapt to rapidly changing and increasingly complex set of directions and/or to manipulate the information. The patient's Cognitive Flexibility score is 83, with a percentile of 13, and falls within the Low Average range.    Processing Speed: Measures how well a subject recognizes and processes information i.e., perceiving, attending/responding to incoming information, motor speed, fine motor coordination, and visual-perceptual ability. The patient's Processing Speed score is 90,  with a percentile of 25, and falls within the Average range.    Executive Function: Measures how well a subject recognizes rules, categories, and manages or navigates rapid decision making. The patient's Executive Function score is 87, with a percentile of 19, and falls within the Low Average range.    Reasoning: Measures how well a subject can perceive and understand the meaning of visual or abstract information and recognizing relationships between visual-abstract concepts. The patient's Reasoning score is 84, with a percentile of 14, and falls in the Low Average range.     Working Memory: Measures how well a subject can perceive and attend to symbols using short-term memory processes. Also measures the ability to carry out short-term memory tasks that support decision making, problem solving, planning, and execution. The patient's Working Memory score is 88, with a percentile of 21, and falls in the Low Average range.    Sustained Attention: Measures how well a subject can direct and focus cognitive activity on specific stimuli. Also measurs how well a subject can focus and complete task or activity, sequence action, and focus during complex thought. The patient's Sustained Attention score is 96, with a percentile of 40, and falls in the Average range.    Simple Attention: Measures the ability to track and respond to a single defined stimulus over lengthy periods of time while performing vigilance and response inhibition quickly and accurately to a simple task. The patient's Simple Attention score is 85, with a percentile of 16, and falls within the Low Average range.    Motor Speed: Measure: Ability to perform simple movements to produce and satisfy an intention towards a manual action and goal. The patient's Motor Speed score is 93, with a percentile of 32, and falls within the Average range.    [place] Multiphasic Personality Inventory - 3 (MMPI-3)   The MMPI-3 was administered to evaluate current level of  emotional distress. Validity profile indicates that the patient appears to have answered in a generally straightforward and consistent manner, and obtained results are considered to be reliable and valid in representing current psychological status. No items were omitted.      Emotional Dysfunction: Patient reports having high levels of stress in a variety of life areas. She also reports being passive and indecisive, not good at making decisions.     Behavioral Dysfunction: Patient reports having some problems with impulsive behaviors.     Thought Dysfunction: No significant findings.    Somatic/Cognitive Dysfunction: Patient reports having significant cognitive complaints in multiple areas including attention, memory, and confusion.    Interpersonal Functioning: Patient reports not enjoying being around other people and not really like people.     Diagnostic Considerations: Evaluate for attention-deficit disorder, impulsive disorders, and anxiety.    Treatment Considerations: Treatment goals should focus on stress management and impulse control. Impulsivity and indecisiveness may interfere with progress in treatment.    Generalized Anxiety Disorder Questionnaire (FAIZAN-7)  This self-report questionnaire is designed to assess for anxiety in adults. Patient s score of 9 indicates that she is experiencing mild symptoms of anxiety.  Patient indicates these symptoms have made it somewhat difficult for them to do work, take care of things at home, or get along with other people.    Patient Health Questionnaire- 9 (PHQ-9)   This self-report questionnaire is designed to assess for depression in adults. Patient s score of 10 indicates that she is experiencing mild to moderate symptoms of depression. Patient indicates these symptoms have made it somewhat difficult for them to do work, take care of things at home, or get along with other people.    [place] Sleepiness Scale (ESS) SF-8  This self-report questionnaire is an  eight-question self-assessment to determine how daytime sleepiness affects your ability to complete routine tasks. Patient's score of 9 indicates they are experiencing significant daytime sleepiness and should seek the advice of a sleep specialist.    Collateral Information   Collateral information was provided by patient's sister.    Summary (based on clinical interview, review of records, test results):    Patient is a 19 year old, [place] individual.  Patient was referred for an assessment by referring provider.  Patient attended the session alone. The purpose of this evaluation is to: evaluate current cognitive functioning, provide treatment recommendations, and clarify diagnosis. Patient reported seeking services at this time for diagnostic assessment and recommendations for treatment.  Patient reported that she has not completed a previous ADHD diagnostic assessment.  Patient has not received a previous diagnosis of ADHD. Patient reported that medication has not been prescribed medication to address these problems.      Patient complains of feeling like this since a little kid, didn't really know about ADHD or that was a diagnosis I could get. When I did find out about it in high school, didn't really think it was a big deal until college. Hard to focus on a book, shows. As a kid always distracted in class and not able to focus. Mom is an immigrant so didn't really know about it. In college, course loads are harder and difficult to get extensions. When doing an assignment, switch to a different assignment. Something always running in head and not able to focus and finish it. Able to make it through in middle/high school but demand is so much more in college and not able to manage.    Patient reported they grew up in [place].  They were raised by biological mother, dad living in  after divorce so talked over phone, now living in [place] and both physically and emotionally present.  Parents  when  patient age 2. Relationship with mom is reported to be pretty good, not as good when younger; relationship with dad is pretty good. Two full siblings (older sister and younger brother) and 9 paternal half siblings (only relationship with 1). Relationship with full siblings is pretty good. Patient reported that their childhood was pretty fun, hung out with cousins and family a lot.  Patient described her childhood family environment as nurturing and stable and having mild to moderate level of chaos because mom was a single mom and would stay with aunts when mom was working.  As a child, patient reported that she failed to complete assigned chores in the home environment, had problems with organization and keeping track of items, misplaced or lost things, needed frequent reminders by parents to be motivated or to complete work, had problems managing temper with frequent emotional outbursts, and had difficulty managing personal hygiene. Patient reported no difficulty with childhood peer relationships.      The patient describes their cultural background as [place].  Cultural influences and impact on patient's life structure, values, norms, and healthcare: I grew up Jehovah's witness and wore the hijab all my life, but I havent really been effected by the way people react or their comments.  Patient identified their preferred language to be English. Patient reported they do not need the assistance of an  or other support involved in therapy.      Patient reported had no significant delays in developmental tasks.   Patient's highest education level was high school graduate. Patient graduated high school in 2023 with a 2.8 GPA. Started college at [employer] working on [employer], completed 3 semesters, taking a break this semester due to attention difficulties.  Patient identified the following learning problems: attention and concentration.  Modifications will not be used to assist communication in  "therapy.  Patient reports they are able to understand written materials. Patient did receive tutoring services during the school years to help get homework done and math. Patient did not receive special education services. Patient reported failure to finish or complete homework. During the elementary, middle, and high school years, patient recalls academic strengths in the area of reading, math, physical education, athletics, social studies, and \"hands on\" activities. Patient reported experiencing academic problems in reading, writing, math, science, and test taking.      Patient provided the following descriptions:  Homework: Always hard, during elementary school didn't get much homework, talked to friends instead of doing work. Middle school didn't really care as much, when did sit down and work on it, it was really hard to get it done. In high school, needed sister to sit next to her to get it done. High school was very hard, needed friends and sister to help get it done and stay focused. Only assignments I could get done was busy work. Discussion posts and actual assignments were really hard to finish. Thinking about a different assignment as working on one, jumped back and forth between assignments. Very difficult to focus.  Transition to College: Not bad in beginning because tried to hold self accountable, go to the library, but only lasted a week. Feel overwhelmed by all the assignments, everything is running in head. Know need to do things to get forward in life but not able to get anything done.  Studying: Don't even do it, never able to actually study, very hard to go through things and review. Can't focus on going through the whole course load and do poorly on finals.  Attention: In class is OK because teacher is in front of me (in person classes), online classes is really bad because cannot focus on homework or watching videos, can't take notes on videos because too many things running in " head.  Attendance: Fine  Peers: Making friends was OK, relationships are good now, able to communicate with friends.  Behavioral (suspended? Expelled?): No  Transferred schools?: In elementary school (5th grade), left [place] [employer] and went to a [employer] school in [place], after half a semester moved back to [employer]. 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   Difficulty with grocery lists?: Try, if don't make a list will forget everything I need, try to sit in car and think about what supposed to buy. Will also buy things I don't need.  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  Patient reports their finances are obtained through employment. The patient's work history includes: [employer], [employer], [employer] [employer].  The longest period of employment has been [employer].  Client has not been terminated from a place of employment.  Patient does identify finances as a current stressor.  Client reported that the current job is a good fit for her skills and personality.  Client reported that she displays some distractible behavior.      Patient reported having sleep disturbance, including: snoring and teeth grinding during childhood. Patient reported currently experiencing sleep disturbance, including: daytime drowsiness / fatigue and snoring.  Client reported sleeping approximately 6-7 hours per night, feel rested if go to sleep at a decent time, currently working on better sleep schedule.  Patient reported that she has not completed a sleep study.  Patient reported having a well balanced diet, an inconsistent diet, and cravings for sweets.  There are not significant nutritional concerns.  Patient reported engaging in regular exercise.     Patient reported the following relationship history: never dated.  Patient's current relationship  status is single for whole life.  Patient identified their sexual orientation as heterosexual.  Patient reported having 0 child(reji). Patient identified parents; siblings; friends as part of their support system.  Patient identified the quality of these relationships as good.  Patient's current living/housing situation involves staying at home with mom and 2 siblings. They report that housing is stable.     Patient reported that they have not been involved with the legal system.  Patient does not report being under probation/ parole/ jurisdiction. Patient has received a 's license.  Patient has not received any moving violations.  Patient reported the following driving habits: attentive and cautious, experiences road rage, and often exceeds the speed limit / speeds.  According to client, other people are comfortable riding as passengers when she is driving.      Patient identified the following strengths or resources that will help them succeed in treatment: Religious / Jewish, commitment to health and well being, community involvement, exercise routine, emperatriz / spirituality, friends / good social support, family support, insight, intelligence, positive work environment, motivation, sense of humor, strong social skills, and work ethic. Things that may interfere with the patient's success in treatment include: none identified.      Patient does not report a family history of mental health concerns.  Patient reports family history is not on file.  Patient does not report Mental Health Diagnosis or Treatment.  Patient has had a physical exam to rule out medical causes for current symptoms.  Date of last physical exam was within the past year. Client was encouraged to follow up with PCP if symptoms were to develop. The patient has a [facility] Provider, who is named Brendan Zacarias.  Patient reports no current medical concerns.  Patient denies any issues with pain.   There are not significant appetite /  nutritional concerns / weight changes.   Patient does not report a history of head injury / trauma / cognitive impairment. Patient reports not taking any current medications. Patient Allergies:  No Known Allergies     Patient did not report a family history of substance use concerns.  Patient has not received chemical dependency treatment in the past.  Patient has not ever been to detox.  Patient reports using caffeine 1 time per week and has 1 cup coffee at a time; patient reports heaviest use was Nov 2024 drinking energy drinks nearly every day. Patient denies any other substance use or abuse and denies any problems from substances. Based on the CAGE score of 0 and clinical interview there are not indications of drug or alcohol abuse.     Patient did not serve in the . There are not indications or report of significant loss, trauma, abuse or neglect issues.  Patient has not been a victim of exploitation.  Concerns for possible neglect are not present.      Patient denies current or past homicidal ideation and behaviors.  Patient denies current/recent suicide ideation, plans, intent, or attempts but reports a history of SI briefly during [facility].  Patient denies current or past self-injurious behaviors.  Patient denied risk behaviors associated with substance use.  Patient denies any high risk behaviors associated with mental health symptoms.  Patient denied current or past personal safety concerns.    Patient denies past of current/recent assaultive behaviors.    Patient denied a history of sexual assault behaviors.     Patient reports there are not firearms in the house.     Patient reports the following protective factors:  forward or future oriented thinking; dedication to family or friends; safe and stable environment; effectively controls impulses; effective problem solving skills; sense of meaning; positive social skills; healthy fear of risky behaviors or pain; strong sense of self worth  or esteem; sense of personal control or determination.     Patient reports the following functional impairments:  academic performance, health maintenance, management of the household and or completion of tasks, money management, organization, relationship(s), and social interactions.       Patient first completed a diagnostic interview in which mental health symptoms, ADHD symptoms, and background information was gathered. Patient self-reported significant symptoms of inattention and hyperactivity-impulsivity, and indicated that their abilities to function at school, socially, and at home are significantly impaired. Further, their self-reported symptoms on Zehra measures of ADHD symptoms were consistent with this information. Their sister reported to observe significant symptoms in their currently and as a child.     An objective measure of personality indicated significant and multiple cognitive complaints, high levels of stress, difficulties with impulse control, and not like being around people.     An objective measure of neurocognitive functioning indicated low average functioning as well as patterns of errors in areas associated with ADHD.     Referral Question Response: DSM-5 criteria for ADHD:   A. Symptom Count - Are there sufficient symptoms for the diagnosis? Yes, patient did endorse sufficient significant symptoms.   B. Onset - Were several symptoms present before 11 years of age? Yes, a significant number of symptoms reportedly began 4.   C. Pervasiveness - Are several symptoms present in at least two settings? Yes, patient reported that symptoms are problematic at home, school, and socially.   D. Impairment - Do symptoms interfere with or reduce the quality of functioning? Yes, patient is unable to complete daily tasks and coursework effectively.   E. Exclusions - Are symptoms better explained by another disorder or factor? No, symptoms are not better explained by other disorders. Patient does have  some daytime sleepiness but not enough to account for these symptoms. Difficulties are explained by an organic basis of inattention.     DIAGNOSES:  F90.2 Attention-Deficit/Hyperactivity Disorder, combined type    PLAN OF CARE:  Discuss the following with your primary care provider:  Consider a trial of a stimulant medication. This may help alleviate some of the patient's attentional symptoms.     Consider initiating individual psychotherapy to help alleviate symptoms in inattention and impulsivity. Research indicates that outcomes are best with both medication and therapy. You can call the  [facility] line at [phone].     The patient would benefit from academic accommodations. Being provided extended time to take exams in a distraction-free area and flexible deadlines for assignments are examples of such accommodations. The patient should meet with a counselor to discuss these and any other options offered by the school.    RECOMMENDATIONS:  Due to the patient's reported attention, concentration, and mood difficulties, the following health/lifestyle changes when combined, can significantly improve symptoms:   Avoid simple carbohydrates at breakfast. Aim for only complex carbohydrates and lean protein for your morning meal.   Engage in aerobic exercise 3 times per week for 30 minutes, ensuring that your heart rate stays within your training zone. Further, reading the book,  Spark,  by Kendrick Maldonado M.D. can help the patient understand the benefits of exercise on the brain.   Research suggest that taking a high-quality multi-vitamin and antioxidant (1/2 cup of blueberries) daily in conjunction with balanced nutrition can be helpful.  Aim for the high end of daily water intake: around 72 ounces per day.  Ensure regular meals and snacks to maintain optimal attention.    The following may be beneficial in managing some of the patient's attention and concentration difficulties:  Due to the  patient's difficulties with attention and concentration, consider working in a completely distraction-free area while completing tasks. Workspaces should be completely clear except for the materials needed for the current task. Both visual and auditory distractions should be decreased as much as possible.  Considering decreased ability to focus and maintain attention, it is recommended that the patient take frequent breaks while completing tasks. This will help to maintain attention and effort. The patient may benefit from the use of a [employer] Timer. The timer works by using built-in break times. After working on a task consistently for 25 minutes, the timer reminds the user to take a five-minute break before continuing, etc. A [employer] timer can be downloaded as a free kuldip to a phone or tablet.  Due to the patient's attentional and concentration symptoms, it is recommended to increase organization with the use of lists and calendars. Significantly increasing structure to the day and adhering to a set schedule can increase your ability to complete responsibilities, track deadline, etc. Breaking these tasks down into their component parts and recording them in a calendar/planner will likely be beneficial. Patient would benefit from setting feasible timelines for completion of activities. By establishing clear priorities for completing tasks, you can more likely complete the most important tasks first. The patient may also choose to elect to a friend or family member to help hold them accountable.    Avoid multitasking. Attempting to work on multiple tasks and projects the same increases the likelihood that an error will occur. Focus on one task at a time.    Due to the patient's reported difficulties with attention, it is recommended to consistently take thorough notes in classes where it is warranted. The patient may consider using a smartpen to take these notes. A smartpen is able to capture audio and transfer  "written notes into a digital document. You can learn more about smartpens at [email or web address].    Due to the patient's difficulties with sleep, it recommended to engage in a relaxing activity up to the point of sleep. The patient may want to spend time reading, drawing/ coloring, practicing mindfulness, listening (not watching) to podcasts, music, etc., or try the [employer] kuldip, which is free to download and may aid falling asleep more easily.    The patient may benefit from engaging in mindfulness practices. This may include breathing techniques, apps that provide guided meditation, or more interactive activities such as coloring.    Develop a \"coping skills jar/box.\" This entails designating a certain container to hold slips of paper with distraction technique ideas written on each slip of paper. Distraction techniques may include listening to a certain type of music, playing on game on your phone, doing a breathing exercise, spending time with a pet, calling a certain individual, looking at a magazine, working on a puzzle, etc. When feeling distressed, choose a slip of paper from the container and engage in that activity rather than focusing on the problem.    Patient may need to negotiate with their employer for more frequent breaks or be allowed to move around more than is typical.      Jyoti Fagan, PhD, LP      Psychological Testing  Billing/Services Summary       Testing Evaluation Services Base: 24008  (1st 60 mins) Add-on: 41046  (each addtl 60 mins)   Record Review and Clarify Referral Question   2/19/2025 9:53-10:03am  2/26/2025 8:17-2:27am, 10:19-10:24am  3/4/2025 1:55-2:00pm 30 minutes   Intra-Session Clinical Decision Making   (Start/Stop), (Date, Day #) 0 minutes   Patient Symptom Management   (Start/Stop), (Date, Day #) 0 minutes   Clinical Decision Making/Battery Modification   (Start/Stop), (Date, Day #) 0 minutes   Integration/Report Generation   4/7/2025 11:15-11:20am CNS  4/7/2025 " 11:20-11:50am [facility]  4/7/2025 11:50am-12:00pm MMPI  4/7/2025 2:25-3:15pm Report 95 minutes   Interactive Feedback Session  4/14/2025 8:01-8:21am 20 minutes   Post-Service Work   4/14/2025 8:21-8:36am 15 minutes   Total Time: 160 minutes (2 hours, 40 minutes)   Total Units: 1 2       Test Administration and Scoring Base: 41276  (1st 30 mins) Add-on: 45072  (each addtl 30 mins)   Test Administration (Face-to-Face)  (Start/Stop); (Start/Stop), (Date, Day #) 0 minutes   Scoring (Non-Face-to-Face)   (Start/Stop), (Date, Day #) 0 minutes   Total Time: 0 minutes (0 hours, 0 minutes)   Total Units: 0 0       Diagnosis(es): (ICD-10)  F90.2 Attention-Deficit/Hyperactivity Disorder, combined type

## 2025-04-14 ENCOUNTER — VIRTUAL VISIT (OUTPATIENT)
Dept: PSYCHOLOGY | Facility: CLINIC | Age: 20
End: 2025-04-14
Payer: COMMERCIAL

## 2025-04-14 ENCOUNTER — TELEPHONE (OUTPATIENT)
Dept: FAMILY MEDICINE | Facility: CLINIC | Age: 20
End: 2025-04-14
Payer: COMMERCIAL

## 2025-04-14 ENCOUNTER — DOCUMENTATION ONLY (OUTPATIENT)
Dept: PSYCHOLOGY | Facility: CLINIC | Age: 20
End: 2025-04-14
Payer: COMMERCIAL

## 2025-04-14 DIAGNOSIS — F90.2 ADHD (ATTENTION DEFICIT HYPERACTIVITY DISORDER), COMBINED TYPE: Primary | ICD-10-CM

## 2025-04-14 PROCEDURE — 96131 PSYCL TST EVAL PHYS/QHP EA: CPT | Mod: 95 | Performed by: PSYCHOLOGIST

## 2025-04-14 PROCEDURE — 96130 PSYCL TST EVAL PHYS/QHP 1ST: CPT | Mod: 95 | Performed by: PSYCHOLOGIST

## 2025-04-14 NOTE — Clinical Note
Greetings,  We completed the ADHD assessment and the patient was diagnosed with ADHD, Combined type. There were no other diagnoses.  I encouraged them to schedule with you to discuss medication options.   Their full report is available for your review.   Please let me know if you have any questions.  Jyoti Fagan, PhD LP

## 2025-04-14 NOTE — PROGRESS NOTES
[facility]    Psychological Report of ADHD Evaluation    PATIENT'S NAME: Lupe Pham  MRN: [id number]  ACCT. NUMBER:  [id number]  DATE OF SERVICE: 4/14/25  SERVICE MODALITY:  Video Visit:      Date(s) of assessment:   Diagnostic Assessment 2/19/2025, 2/26/2025  Zehra self-report and collateral measures scored and interpreted 4/7/2025  MMPI 4/7/2025  CNS 4/7/2025    Information about appointment:  Patient attended two  sessions to aid in determining patient's mental health diagnosis or diagnoses and treatment recommendations that best address patient concerns. Patient records including medical were reviewed. A diagnostic assessment was conducted at the initial appointment. Patient completed several rating scales to assist in assessing attention-related and other mental health symptoms that may be causing impairments in functioning. Rating scales were also completed by a collateral contact.    Assessment tools:   Some measures may have been completed remotely due to virtual care, in which case observation of task completion was not possible.    Zehra Adult ADHD Rating Scale-IV: Self and Other Reports (BAARS-IV), Zehra Functional Impairment Scale: Self and Other Reports (BFIS), Zehra Deficits in Executive Functioning Scale: Self and Other Reports (BDEFS), Patient Health Questionnaire-9 (PHQ-9), Generalized Anxiety Disorder-7 (FAIZAN-7), [place] Multiphasic Personality Inventory (MMPI), CNS Vital Signs Neurocognitive Battery, and [place] Sleepiness Scale.    Assessment Results:    Zehra Adult ADHD Rating Scale-IV: Self and Other Reports (BAARS-IV)  The BAARS-IV assesses for symptoms of ADHD that are experienced in one's daily life. This assessment measure includes self and collateral rating scales designed to provide information regarding current and childhood symptoms of ADHD including inattention, hyperactivity, and impulsivity. Self-report scores are reported as percentiles. Scores at the  "76th-83rd percentile are considered marginal, scores at the 84th-92nd percentile are considered borderline, scores at the 93rd-95th percentile are considered mild, scores at the 96th-98th percentile are considered moderate, and those at the 99th percentile are considered severe. Collateral or \"other\" rating scales are reported as number of symptoms observed in comparison to those reported by the patient. Norms and percentile scores are not available for collateral reports.     Current Symptoms Scale--Self Report:   Patient completed the self-report inventory of current symptoms. The results indicate that the patient's Total ADHD Score was 55 which places her in the 99+ percentile for overall ADHD symptoms. In addition, the patient endorsed 8/9 (98th percentile) Inattention symptoms, 4/9 (95th percentile) Hyperactivity-Impulsivity symptoms, and 2/9 (85th percentile) Sluggish Cognitive Tempo symptoms. Patient indicated that the reported symptoms have resulted in impaired functioning in school, home, and social relationships. Overall, the results suggest the patient is expeiencing Severe ADHD symptoms.     Current Symptoms Scale--Other Report:  Patient's sister completed the collateral report inventory of current symptoms. Based on the collateral contact's observation of symptoms, the patient demonstrates 6/9 Inattention symptoms, 4/9 Hyperactivity-Impulsivity symptoms, and 2/9 Sluggish Cognitive Tempo symptoms. The patient's Total ADHD Score was 49. The collateral contact indicated the patient demonstrates impaired functioning in school, home, and social relationships. The collateral- and self-report scores are not significantly different.     Childhood Symptoms Scale--Self-Report:  Patient completed the self-report inventory of childhood symptoms. The results indicate that the patient's Total ADHD Score was 30 which places her in the 98th percentile for overall ADHD symptoms in childhood. In addition, the patient " "endorsed 7/9 (96th percentile) Inattention symptoms and  5/9 (93rd percentile) Hyperactivity-Impulsivity symptoms. Patient indicated that the reported symptoms resulted in impaired functioning in school, home, and social relationships. Overall, the results suggest the patient experienced  Mild symptoms of ADHD as a child.     Childhood Symptoms Scale--Other Report:  Patient's sister completed the collateral report inventory of childhood symptoms. Based on the collateral contact's recollection of patient's childhood symptoms, the patient demonstrated 8/9 Inattention symptoms and 6/9 Hyperactivity-Impulsivity symptoms. The patient's Total ADHD Score was 62. The collateral contact indicated the patient demonstrates impaired functioning in school, home, and social relationships. The collateral- and self-report scores are not significantly different.                           Zehra Functional Impairment Scale: Self and Other Reports (BFIS)  The BFIS is used to assess the level of impairment in major life/daily activities that may be due to mental health symptoms. This assessment measure includes self and collateral rating scales. Self-report scores are reported as percentiles. Scores at the 76th-83rd percentile are considered marginal, scores at the 84th-92nd percentile are considered borderline, scores at the 93rd-95th percentile are considered mild, scores at the 96th-98th percentile are considered moderate, and those at the 99th percentile are considered severe.Collateral or \"other\" rating scales are reported as number of symptoms observed in comparison to those reported by the patient. Norms and percentile scores are not available for collateral reports.     Results indicate the patient identified impairment (scores at or greater than 93rd percentile) in the following areas: home-chores, community activities, education, and daily responsibilities The patient's Mean Impairment Score was 5.3 (92nd percentile) " "indicating the patient is reporting Borderline impairment in functioning across domains. Patient's sister completed the collateral rating scale, which indicated discrepant results. The collateral contact's scores were generally lower than the patient's report.     Zehra Deficits in Executive Functioning Scale (BDEFS)  The BDEFS is a measure used for evaluating adult executive functioning in daily activities.This assessment measure includes self and collateral rating scales. Self-report scores are reported as percentiles. Scores at the 76th-83rd percentile are considered marginal, scores at the 84th-92nd percentile are considered borderline, scores at the 93rd-95th percentile are considered mild, scores at the 96th-98th percentile are considered moderate, and those at the 99th percentile are considered severe.Collateral or \"other\" rating scales are reported as number of symptoms observed in comparison to those reported by the patient. Norms and percentile scores are not available for collateral reports.     Results indicate the patient's Total Executive Functioning Score was 221 (96th percentile). The ADHD-Executive Functioning Index score was 30 (97th percentile). These scores suggest the patient has Moderate deficits in executive functioning. These deficits are likely to be due to ADHD. Results indicate the patient identified significant deficits in the following areas: self-management to time (99+ percentile) , self-organization/problem-solving (99+ percentile), and self-motivation (95th percentile). Patient's sister completed the collateral rating scale, which indicated similar results. The collateral contact's scores were generally the same as the patient's report with the exception of self-motivation (sister not seeing any impairment).    CNS Vital Signs Neurocognitive Battery  The CNS Vital Signs Neurocognitive Battery is a remotely-administered assessment comprised of seven core subtests to individually " measure the patient's verbal memory, visual memory, motor speed, psychomotor speed, reaction time, focus, ability to sustain attention and ability to adapt to changing rules and tasks.      Above average domain scores indicate a standard score (SS) greater than 109 or a Percentile Rank (IL) greater than 74, indicating a high functioning test subject. Average is a SS  or IL 25-74, indicating normal function. Low Average is a SS 80-89 or IL 9-24 indicating a slight deficit or impairment. Below Average is a SS 70-79 or IL 2-8, indicating a moderate level of deficit or impairment. Very Low is a SS less than 70 or a IL less than 2, indicating a deficit and impairment.  Validity Indicator denotes a guideline for representing the possibility of an invalid test or domain score, and can be influenced by patient understanding, effort, or other conditions.    The patient's results are detailed below:    Domain Standard Score Percentile Description Validity   Neurocognitive Index 75 5 Low Yes   Composite Memory Measure 55 1 Very Low Yes   Verbal Memory 61 1 Very Low Yes   Visual Memory 66 1 Very Low Yes   Psychomotor Speed 88 21 Low Average Yes   Reaction Time 61 1 Very Low Yes   Complex Attention 89 23 Low Average Yes   Cognitive Flexibility 83 13 Low Average Yes   Processing Speed 90 25 Average Yes   Executive Function 87 19 Low Average Yes   Reasoning 84 14 Low Average Yes   Working Memory 88 21 Low Average Yes   Sustained Attention  96 40 Average Yes   Simple Attention 85 16 Low Average Yes   Motor Speed 93 32 Average Yes     Neurocognitive Index (NCI): Measures an average score derived from the domain scores or a general assessment of the overall neurocognitive status of the patient. The patient's NCI score is 75, with a percentile of 5, and falls within the Low range.    Composite Memory: Measures how well subject can recognize, remember, and retrieve words and geometric figures, and is comprised of the Visual and  Verbal Memory domains. The patient's Composite Memory score is 55, with a percentile of 1, and falls within the Very Low range.    Verbal Memory: Measures how well subject can recognize, remember, and retrieve words. The patient's Verbal Memory score is 61, with a percentile of 1, and falls within the Very Low range.    Visual Memory: Measures how well subject can recognize, remember and retrieve geometric figures. The patient's Visual Memory score is 66, with a percentile of 1, and falls within the Very Low range.    Psychomotor Speed: Measures how well a subject perceives, attends, responds to complex visual-perceptual information and performs simple fine motor coordination, and is comprised of the Motor Speed and Processing Speed indexes. The patient's Psychomotor Speed score is 88, with a percentile of 21, and falls within the Low Average range.    Reaction Time: Measures how quickly the subject can react, in milliseconds, to a simple and increasingly complex direction set. The patient's Reaction Time score is 61, with a percentile of 1, and falls within the Very Low range.    Complex Attention: Measures the ability to track and respond to a variety of stimuli over lengthy periods of time and/or perform complex mental tasks requiring vigilance quickly and accurately. The patient's Complex Attention score is 89, with a percentile of 23, and falls within the Low Average range.    Cognitive Flexibility: Measures how well subject is able to adapt to rapidly changing and increasingly complex set of directions and/or to manipulate the information. The patient's Cognitive Flexibility score is 83, with a percentile of 13, and falls within the Low Average range.    Processing Speed: Measures how well a subject recognizes and processes information i.e., perceiving, attending/responding to incoming information, motor speed, fine motor coordination, and visual-perceptual ability. The patient's Processing Speed score is 90,  with a percentile of 25, and falls within the Average range.    Executive Function: Measures how well a subject recognizes rules, categories, and manages or navigates rapid decision making. The patient's Executive Function score is 87, with a percentile of 19, and falls within the Low Average range.    Reasoning: Measures how well a subject can perceive and understand the meaning of visual or abstract information and recognizing relationships between visual-abstract concepts. The patient's Reasoning score is 84, with a percentile of 14, and falls in the Low Average range.     Working Memory: Measures how well a subject can perceive and attend to symbols using short-term memory processes. Also measures the ability to carry out short-term memory tasks that support decision making, problem solving, planning, and execution. The patient's Working Memory score is 88, with a percentile of 21, and falls in the Low Average range.    Sustained Attention: Measures how well a subject can direct and focus cognitive activity on specific stimuli. Also measurs how well a subject can focus and complete task or activity, sequence action, and focus during complex thought. The patient's Sustained Attention score is 96, with a percentile of 40, and falls in the Average range.    Simple Attention: Measures the ability to track and respond to a single defined stimulus over lengthy periods of time while performing vigilance and response inhibition quickly and accurately to a simple task. The patient's Simple Attention score is 85, with a percentile of 16, and falls within the Low Average range.    Motor Speed: Measure: Ability to perform simple movements to produce and satisfy an intention towards a manual action and goal. The patient's Motor Speed score is 93, with a percentile of 32, and falls within the Average range.    [place] Multiphasic Personality Inventory - 3 (MMPI-3)   The MMPI-3 was administered to evaluate current level of  emotional distress. Validity profile indicates that the patient appears to have answered in a generally straightforward and consistent manner, and obtained results are considered to be reliable and valid in representing current psychological status. No items were omitted.      Emotional Dysfunction: Patient reports having high levels of stress in a variety of life areas. She also reports being passive and indecisive, not good at making decisions.     Behavioral Dysfunction: Patient reports having some problems with impulsive behaviors.     Thought Dysfunction: No significant findings.    Somatic/Cognitive Dysfunction: Patient reports having significant cognitive complaints in multiple areas including attention, memory, and confusion.    Interpersonal Functioning: Patient reports not enjoying being around other people and not really like people.     Diagnostic Considerations: Evaluate for attention-deficit disorder, impulsive disorders, and anxiety.    Treatment Considerations: Treatment goals should focus on stress management and impulse control. Impulsivity and indecisiveness may interfere with progress in treatment.    Generalized Anxiety Disorder Questionnaire (FAIZAN-7)  This self-report questionnaire is designed to assess for anxiety in adults. Patient s score of 9 indicates that she is experiencing mild symptoms of anxiety.  Patient indicates these symptoms have made it somewhat difficult for them to do work, take care of things at home, or get along with other people.    Patient Health Questionnaire- 9 (PHQ-9)   This self-report questionnaire is designed to assess for depression in adults. Patient s score of 10 indicates that she is experiencing mild to moderate symptoms of depression. Patient indicates these symptoms have made it somewhat difficult for them to do work, take care of things at home, or get along with other people.    [place] Sleepiness Scale (ESS) SF-8  This self-report questionnaire is an  eight-question self-assessment to determine how daytime sleepiness affects your ability to complete routine tasks. Patient's score of 9 indicates they are experiencing significant daytime sleepiness and should seek the advice of a sleep specialist.    Collateral Information   Collateral information was provided by patient's sister.    Summary (based on clinical interview, review of records, test results):    Patient is a 19 year old, [place] individual.  Patient was referred for an assessment by referring provider.  Patient attended the session alone. The purpose of this evaluation is to: evaluate current cognitive functioning, provide treatment recommendations, and clarify diagnosis. Patient reported seeking services at this time for diagnostic assessment and recommendations for treatment.  Patient reported that she has not completed a previous ADHD diagnostic assessment.  Patient has not received a previous diagnosis of ADHD. Patient reported that medication has not been prescribed medication to address these problems.      Patient complains of feeling like this since a little kid, didn't really know about ADHD or that was a diagnosis I could get. When I did find out about it in high school, didn't really think it was a big deal until college. Hard to focus on a book, shows. As a kid always distracted in class and not able to focus. Mom is an immigrant so didn't really know about it. In college, course loads are harder and difficult to get extensions. When doing an assignment, switch to a different assignment. Something always running in head and not able to focus and finish it. Able to make it through in middle/high school but demand is so much more in college and not able to manage.    Patient reported they grew up in [place].  They were raised by biological mother, dad living in  after divorce so talked over phone, now living in [place] and both physically and emotionally present.  Parents  when  patient age 2. Relationship with mom is reported to be pretty good, not as good when younger; relationship with dad is pretty good. Two full siblings (older sister and younger brother) and 9 paternal half siblings (only relationship with 1). Relationship with full siblings is pretty good. Patient reported that their childhood was pretty fun, hung out with cousins and family a lot.  Patient described her childhood family environment as nurturing and stable and having mild to moderate level of chaos because mom was a single mom and would stay with aunts when mom was working.  As a child, patient reported that she failed to complete assigned chores in the home environment, had problems with organization and keeping track of items, misplaced or lost things, needed frequent reminders by parents to be motivated or to complete work, had problems managing temper with frequent emotional outbursts, and had difficulty managing personal hygiene. Patient reported no difficulty with childhood peer relationships.      The patient describes their cultural background as [place].  Cultural influences and impact on patient's life structure, values, norms, and healthcare: I grew up Rastafari and wore the hijab all my life, but I havent really been effected by the way people react or their comments.  Patient identified their preferred language to be English. Patient reported they do not need the assistance of an  or other support involved in therapy.      Patient reported had no significant delays in developmental tasks.   Patient's highest education level was high school graduate. Patient graduated high school in 2023 with a 2.8 GPA. Started college at [employer] working on [employer], completed 3 semesters, taking a break this semester due to attention difficulties.  Patient identified the following learning problems: attention and concentration.  Modifications will not be used to assist communication in  "therapy.  Patient reports they are able to understand written materials. Patient did receive tutoring services during the school years to help get homework done and math. Patient did not receive special education services. Patient reported failure to finish or complete homework. During the elementary, middle, and high school years, patient recalls academic strengths in the area of reading, math, physical education, athletics, social studies, and \"hands on\" activities. Patient reported experiencing academic problems in reading, writing, math, science, and test taking.      Patient provided the following descriptions:  Homework: Always hard, during elementary school didn't get much homework, talked to friends instead of doing work. Middle school didn't really care as much, when did sit down and work on it, it was really hard to get it done. In high school, needed sister to sit next to her to get it done. High school was very hard, needed friends and sister to help get it done and stay focused. Only assignments I could get done was busy work. Discussion posts and actual assignments were really hard to finish. Thinking about a different assignment as working on one, jumped back and forth between assignments. Very difficult to focus.  Transition to College: Not bad in beginning because tried to hold self accountable, go to the library, but only lasted a week. Feel overwhelmed by all the assignments, everything is running in head. Know need to do things to get forward in life but not able to get anything done.  Studying: Don't even do it, never able to actually study, very hard to go through things and review. Can't focus on going through the whole course load and do poorly on finals.  Attention: In class is OK because teacher is in front of me (in person classes), online classes is really bad because cannot focus on homework or watching videos, can't take notes on videos because too many things running in " head.  Attendance: Fine  Peers: Making friends was OK, relationships are good now, able to communicate with friends.  Behavioral (suspended? Expelled?): No  Transferred schools?: In elementary school (5th grade), left [employer] and went to a [employer] school in [place], after half a semester moved back to [employer] Curriculum in Judaism school was a bit behind so moved back.   Difficulty with grocery lists?: Try, if don't make a list will forget everything I need, try to sit in car and think about what supposed to buy. Will also buy things I don't need.  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  Patient reports their finances are obtained through employment. The patient's work history includes: [employer], [employer], [employer], [employer].  The longest period of employment has been [employer].  Client has not been terminated from a place of employment.  Patient does identify finances as a current stressor.  Client reported that the current job is a good fit for her skills and personality.  Client reported that she displays some distractible behavior.      Patient reported having sleep disturbance, including: snoring and teeth grinding during childhood. Patient reported currently experiencing sleep disturbance, including: daytime drowsiness / fatigue and snoring.  Client reported sleeping approximately 6-7 hours per night, feel rested if go to sleep at a decent time, currently working on better sleep schedule.  Patient reported that she has not completed a sleep study.  Patient reported having a well balanced diet, an inconsistent diet, and cravings for sweets.  There are not significant nutritional concerns.  Patient reported engaging in regular exercise.     Patient reported the following relationship history: never dated.  Patient's current relationship  status is single for whole life.  Patient identified their sexual orientation as heterosexual.  Patient reported having 0 child(reji). Patient identified parents; siblings; friends as part of their support system.  Patient identified the quality of these relationships as good.  Patient's current living/housing situation involves staying at home with mom and 2 siblings. They report that housing is stable.     Patient reported that they have not been involved with the legal system.  Patient does not report being under probation/ parole/ jurisdiction. Patient has received a 's license.  Patient has not received any moving violations.  Patient reported the following driving habits: attentive and cautious, experiences road rage, and often exceeds the speed limit / speeds.  According to client, other people are comfortable riding as passengers when she is driving.      Patient identified the following strengths or resources that will help them succeed in treatment: Quaker / Baptism, commitment to health and well being, community involvement, exercise routine, emperatriz / spirituality, friends / good social support, family support, insight, intelligence, positive work environment, motivation, sense of humor, strong social skills, and work ethic. Things that may interfere with the patient's success in treatment include: none identified.      Patient does not report a family history of mental health concerns.  Patient reports family history is not on file.  Patient does not report Mental Health Diagnosis or Treatment.  Patient has had a physical exam to rule out medical causes for current symptoms.  Date of last physical exam was within the past year. Client was encouraged to follow up with PCP if symptoms were to develop. The patient has a [facility] Provider, who is named Brendan Zacarias.  Patient reports no current medical concerns.  Patient denies any issues with pain.   There are not significant appetite /  nutritional concerns / weight changes.   Patient does not report a history of head injury / trauma / cognitive impairment. Patient reports not taking any current medications. Patient Allergies:  No Known Allergies     Patient did not report a family history of substance use concerns.  Patient has not received chemical dependency treatment in the past.  Patient has not ever been to detox.  Patient reports using caffeine 1 time per week and has 1 cup coffee at a time; patient reports heaviest use was Nov 2024 drinking energy drinks nearly every day. Patient denies any other substance use or abuse and denies any problems from substances. Based on the CAGE score of 0 and clinical interview there are not indications of drug or alcohol abuse.     Patient did not serve in the . There are not indications or report of significant loss, trauma, abuse or neglect issues.  Patient has not been a victim of exploitation.  Concerns for possible neglect are not present.      Patient denies current or past homicidal ideation and behaviors.  Patient denies current/recent suicide ideation, plans, intent, or attempts but reports a history of SI briefly during [facility].  Patient denies current or past self-injurious behaviors.  Patient denied risk behaviors associated with substance use.  Patient denies any high risk behaviors associated with mental health symptoms.  Patient denied current or past personal safety concerns.    Patient denies past of current/recent assaultive behaviors.    Patient denied a history of sexual assault behaviors.     Patient reports there are not firearms in the house.     Patient reports the following protective factors:  forward or future oriented thinking; dedication to family or friends; safe and stable environment; effectively controls impulses; effective problem solving skills; sense of meaning; positive social skills; healthy fear of risky behaviors or pain; strong sense of self worth  or esteem; sense of personal control or determination.     Patient reports the following functional impairments:  academic performance, health maintenance, management of the household and or completion of tasks, money management, organization, relationship(s), and social interactions.       Patient first completed a diagnostic interview in which mental health symptoms, ADHD symptoms, and background information was gathered. Patient self-reported significant symptoms of inattention and hyperactivity-impulsivity, and indicated that their abilities to function at school, socially, and at home are significantly impaired. Further, their self-reported symptoms on Zehra measures of ADHD symptoms were consistent with this information. Their sister reported to observe significant symptoms in their currently and as a child.     An objective measure of personality indicated significant and multiple cognitive complaints, high levels of stress, difficulties with impulse control, and not like being around people.     An objective measure of neurocognitive functioning indicated low average functioning as well as patterns of errors in areas associated with ADHD.     Referral Question Response: DSM-5 criteria for ADHD:   A. Symptom Count - Are there sufficient symptoms for the diagnosis? Yes, patient did endorse sufficient significant symptoms.   B. Onset - Were several symptoms present before 11 years of age? Yes, a significant number of symptoms reportedly began 4.   C. Pervasiveness - Are several symptoms present in at least two settings? Yes, patient reported that symptoms are problematic at home, school, and socially.   D. Impairment - Do symptoms interfere with or reduce the quality of functioning? Yes, patient is unable to complete daily tasks and coursework effectively.   E. Exclusions - Are symptoms better explained by another disorder or factor? No, symptoms are not better explained by other disorders. Patient does have  some daytime sleepiness but not enough to account for these symptoms. Difficulties are explained by an organic basis of inattention.     DIAGNOSES:  F90.2 Attention-Deficit/Hyperactivity Disorder, combined type    PLAN OF CARE:  Discuss the following with your primary care provider:  Consider a trial of a stimulant medication. This may help alleviate some of the patient's attentional symptoms.     Consider initiating individual psychotherapy to help alleviate symptoms in inattention and impulsivity. Research indicates that outcomes are best with both medication and therapy. You can call the  [facility] line at [phone].     The patient would benefit from academic accommodations. Being provided extended time to take exams in a distraction-free area and flexible deadlines for assignments are examples of such accommodations. The patient should meet with a counselor to discuss these and any other options offered by the school.    RECOMMENDATIONS:  Due to the patient's reported attention, concentration, and mood difficulties, the following health/lifestyle changes when combined, can significantly improve symptoms:   Avoid simple carbohydrates at breakfast. Aim for only complex carbohydrates and lean protein for your morning meal.   Engage in aerobic exercise 3 times per week for 30 minutes, ensuring that your heart rate stays within your training zone. Further, reading the book,  Spark,  by Kendrick Maldonado M.D. can help the patient understand the benefits of exercise on the brain.   Research suggest that taking a high-quality multi-vitamin and antioxidant (1/2 cup of blueberries) daily in conjunction with balanced nutrition can be helpful.  Aim for the high end of daily water intake: around 72 ounces per day.  Ensure regular meals and snacks to maintain optimal attention.    The following may be beneficial in managing some of the patient's attention and concentration difficulties:  Due to the  patient's difficulties with attention and concentration, consider working in a completely distraction-free area while completing tasks. Workspaces should be completely clear except for the materials needed for the current task. Both visual and auditory distractions should be decreased as much as possible.  Considering decreased ability to focus and maintain attention, it is recommended that the patient take frequent breaks while completing tasks. This will help to maintain attention and effort. The patient may benefit from the use of a [employer] Timer. The timer works by using built-in break times. After working on a task consistently for 25 minutes, the timer reminds the user to take a five-minute break before continuing, etc. A [employer] timer can be downloaded as a free kuldip to a phone or tablet.  Due to the patient's attentional and concentration symptoms, it is recommended to increase organization with the use of lists and calendars. Significantly increasing structure to the day and adhering to a set schedule can increase your ability to complete responsibilities, track deadline, etc. Breaking these tasks down into their component parts and recording them in a calendar/planner will likely be beneficial. Patient would benefit from setting feasible timelines for completion of activities. By establishing clear priorities for completing tasks, you can more likely complete the most important tasks first. The patient may also choose to elect to a friend or family member to help hold them accountable.    Avoid multitasking. Attempting to work on multiple tasks and projects the same increases the likelihood that an error will occur. Focus on one task at a time.    Due to the patient's reported difficulties with attention, it is recommended to consistently take thorough notes in classes where it is warranted. The patient may consider using a smartpen to take these notes. A smartpen is able to capture audio and transfer  "written notes into a digital document. You can learn more about smartpens at www.Topgueste.Adility.    Due to the patient's difficulties with sleep, it recommended to engage in a relaxing activity up to the point of sleep. The patient may want to spend time reading, drawing/ coloring, practicing mindfulness, listening (not watching) to podcasts, music, etc., or try the [employer] kuldip, which is free to download and may aid falling asleep more easily.    The patient may benefit from engaging in mindfulness practices. This may include breathing techniques, apps that provide guided meditation, or more interactive activities such as coloring.    Develop a \"coping skills jar/box.\" This entails designating a certain container to hold slips of paper with distraction technique ideas written on each slip of paper. Distraction techniques may include listening to a certain type of music, playing on game on your phone, doing a breathing exercise, spending time with a pet, calling a certain individual, looking at a magazine, working on a puzzle, etc. When feeling distressed, choose a slip of paper from the container and engage in that activity rather than focusing on the problem.    Patient may need to negotiate with their employer for more frequent breaks or be allowed to move around more than is typical.      Jyoti Fagan, PhD, LP      Psychological Testing  Billing/Services Summary       Testing Evaluation Services Base: 58600  (1st 60 mins) Add-on: 02420  (each addtl 60 mins)   Record Review and Clarify Referral Question   2/19/2025 9:53-10:03am  2/26/2025 8:17-2:27am, 10:19-10:24am  3/4/2025 1:55-2:00pm 30 minutes   Intra-Session Clinical Decision Making   (Start/Stop), (Date, Day #) 0 minutes   Patient Symptom Management   (Start/Stop), (Date, Day #) 0 minutes   Clinical Decision Making/Battery Modification   (Start/Stop), (Date, Day #) 0 minutes   Integration/Report Generation   4/7/2025 11:15-11:20am CNS  4/7/2025 " 11:20-11:50am [facility]  4/7/2025 11:50am-12:00pm MMPI  4/7/2025 2:25-3:15pm Report 95 minutes   Interactive Feedback Session  4/14/2025 8:01-8:21am 20 minutes   Post-Service Work   4/14/2025 8:21-8:36am 15 minutes   Total Time: 160 minutes (2 hours, 40 minutes)   Total Units: 1 2       Test Administration and Scoring Base: 86221  (1st 30 mins) Add-on: 52966  (each addtl 30 mins)   Test Administration (Face-to-Face)  (Start/Stop); (Start/Stop), (Date, Day #) 0 minutes   Scoring (Non-Face-to-Face)   (Start/Stop), (Date, Day #) 0 minutes   Total Time: 0 minutes (0 hours, 0 minutes)   Total Units: 0 0       Diagnosis(es): (ICD-10)  F90.2 Attention-Deficit/Hyperactivity Disorder, combined type

## 2025-04-14 NOTE — PROGRESS NOTES
[facility]                                     Progress Note - ADHD Evaluation Feedback Session    Patient Name: Lupe Pham  Date: 4/14/2025         Service Type: Individual      Session Start Time: 8:01am  Session End Time: 8:21am     Session Length: 20min    Session #: 3    Attendees: Client attended alone    Service Modality:  Video Visit:      Provider verified identity through the following two step process.  Patient provided:  Patient is known previously to provider    Telemedicine Visit: The patient's condition can be safely assessed and treated via synchronous audio and visual telemedicine encounter.      Reason for Telemedicine Visit: Patient has requested telehealth visit    Originating Site (Patient Location): Patient's home    Distant Site (Provider Location): [facility]    Consent:  The patient/guardian has verbally consented to: the potential risks and benefits of telemedicine (video visit) versus in person care; bill my insurance or make self-payment for services provided; and responsibility for payment of non-covered services.     Patient would like the video invitation sent by:  My Chart    Mode of Communication:  Video Conference via Amwell    Distant Location (Provider):  Off-site    As the provider I attest to compliance with applicable laws and regulations related to telemedicine.    DATA  Interactive Complexity: No  Crisis: No        Progress Since Last Session (Related to Symptoms / Goals / Homework):   Symptoms: No change      Homework: Achieved / completed to satisfaction      Episode of Care Goals: No improvement - CONTEMPLATION (Considering change and yet undecided); Intervened by assessing the negative and positive thinking (ambivalence) about behavior change     Current / Ongoing Stressors and Concerns:   Provided feedback regarding ADHD evaluation. Patient's questions were answered. Patient understood and agreed with  feedback.     Treatment Objective(s) Addressed in This Session:   ADHD evaluation completed. Feedback provided.      Intervention:   CBT: positive reinforcement, behavior modification  Emotion Focused Therapy: emotion checking  Motivational Interviewing: open ended questions    Assessments completed prior to visit: None     ASSESSMENT: Current Emotional / Mental Status (status of significant symptoms):   Risk status (Self / Other harm or suicidal ideation)   Patient denies current fears or concerns for personal safety.   Patient denies current or recent suicidal ideation or behaviors.   Patient denies current or recent homicidal ideation or behaviors.   Patient denies current or recent self injurious behavior or ideation.   Patient denies other safety concerns.   Patient reports there has been no change in risk factors since their last session.     Patient reports there has been no change in protective factors since their last session.     Recommended that patient call 911 or go to the local ED should there be a change in any of these risk factors     Appearance:   Appropriate    Eye Contact:   Good    Psychomotor Behavior: Normal    Attitude:   Cooperative  Interested Friendly Pleasant   Orientation:   All   Speech    Rate / Production: Normal/ Responsive Normal     Volume:  Normal    Mood:    Normal   Affect:    Appropriate    Thought Content:  Clear    Thought Form:  Coherent  Logical    Insight:    Good      Medication Review:   No current psychiatric medications prescribed     Medication Compliance:   NA     Changes in Health Issues:   None reported     Chemical Use Review:   Substance Use: Chemical use reviewed, no active concerns identified      Tobacco Use: No current tobacco use.      Diagnosis:  1. ADHD (attention deficit hyperactivity disorder), combined type        Collateral Reports Completed:   Routed note to PCP    PLAN: (Patient Tasks / Therapist Tasks / Other)  Patient will follow up with PCP for  treatment recommendations.      Jyoti Fagan, PhD  April 14, 2025      Psychological Testing  Billing/Services Summary       Testing Evaluation Services Base: 83268  (1st 60 mins) Add-on: 35545  (each addtl 60 mins)   Record Review and Clarify Referral Question   2/19/2025 9:53-10:03am  2/26/2025 8:17-2:27am, 10:19-10:24am  3/4/2025 1:55-2:00pm 30 minutes   Intra-Session Clinical Decision Making   (Start/Stop), (Date, Day #) 0 minutes   Patient Symptom Management   (Start/Stop), (Date, Day #) 0 minutes   Clinical Decision Making/Battery Modification   (Start/Stop), (Date, Day #) 0 minutes   Integration/Report Generation   4/7/2025 11:15-11:20am CNS  4/7/2025 11:20-11:50am [facility]  4/7/2025 11:50am-12:00pm MMPI  4/7/2025 2:25-3:15pm Report 95 minutes   Interactive Feedback Session  4/14/2025 8:01-8:21am 20 minutes   Post-Service Work   4/14/2025 8:21-8:36am 15 minutes   Total Time: 160 minutes (2 hours, 40 minutes)   Total Units: 1 2       Test Administration and Scoring Base: 69853  (1st 30 mins) Add-on: 12150  (each addtl 30 mins)   Test Administration (Face-to-Face)  (Start/Stop); (Start/Stop), (Date, Day #) 0 minutes   Scoring (Non-Face-to-Face)   (Start/Stop), (Date, Day #) 0 minutes   Total Time: 0 minutes (0 hours, 0 minutes)   Total Units: 0 0       Diagnosis(es): (ICD-10)  F90.2 Attention-Deficit/Hyperactivity Disorder, combined type

## 2025-04-14 NOTE — PROGRESS NOTES
Patient is recently diagnosed with ADHD by psychology team.  Please assist her with scheduling a virtual visit with me to discuss treatment options    Brendan Zacarias MD  [facility], [facility] [place]

## 2025-04-14 NOTE — TELEPHONE ENCOUNTER
Reason for Call:  Appointment Request    Patient requesting this type of appt: Chronic Diease Management/Medication/Follow-Up    Requested provider:  Brendan Zacarias    Reason patient unable to be scheduled: Not within requested timeframe    When does patient want to be seen/preferred time:  anytime next week end of April    Comments: First available with provider is not until may. Pt will like to be seen sooner by requested provider.     Could we send this information to you in Learn[employer] or would you prefer to receive a phone call?:   Patient would like to be contacted via Learn[employer]    Call taken on 4/14/2025 at 8:30 AM by Kenneth Brennan

## 2025-04-15 NOTE — TELEPHONE ENCOUNTER
Patient is recently diagnosed with ADHD by psychology team.  Please assist her with scheduling a virtual visit with me to discuss treatment options     Brendan Zacarias MD  [facility], [facility] [place]           Called and left a voicemail, will send a my chart to try and get the patient scheduled as well

## 2025-04-22 ENCOUNTER — OFFICE VISIT (OUTPATIENT)
Dept: FAMILY MEDICINE | Facility: CLINIC | Age: 20
End: 2025-04-22
Payer: COMMERCIAL

## 2025-04-22 VITALS
HEART RATE: 76 BPM | TEMPERATURE: 97.9 F | OXYGEN SATURATION: 98 % | HEIGHT: 69 IN | BODY MASS INDEX: 35.7 KG/M2 | WEIGHT: 241 LBS | DIASTOLIC BLOOD PRESSURE: 70 MMHG | RESPIRATION RATE: 16 BRPM | SYSTOLIC BLOOD PRESSURE: 98 MMHG

## 2025-04-22 DIAGNOSIS — F90.2 ADHD (ATTENTION DEFICIT HYPERACTIVITY DISORDER), COMBINED TYPE: Primary | ICD-10-CM

## 2025-04-22 PROCEDURE — 3074F SYST BP LT 130 MM HG: CPT | Performed by: FAMILY MEDICINE

## 2025-04-22 PROCEDURE — G2211 COMPLEX E/M VISIT ADD ON: HCPCS | Performed by: FAMILY MEDICINE

## 2025-04-22 PROCEDURE — 1126F AMNT PAIN NOTED NONE PRSNT: CPT | Performed by: FAMILY MEDICINE

## 2025-04-22 PROCEDURE — 3078F DIAST BP <80 MM HG: CPT | Performed by: FAMILY MEDICINE

## 2025-04-22 PROCEDURE — 99214 OFFICE O/P EST MOD 30 MIN: CPT | Performed by: FAMILY MEDICINE

## 2025-04-22 RX ORDER — DEXTROAMPHETAMINE SACCHARATE, AMPHETAMINE ASPARTATE MONOHYDRATE, DEXTROAMPHETAMINE SULFATE AND AMPHETAMINE SULFATE 2.5; 2.5; 2.5; 2.5 MG/1; MG/1; MG/1; MG/1
10 CAPSULE, EXTENDED RELEASE ORAL EVERY MORNING
Qty: 30 CAPSULE | Refills: 0 | Status: SHIPPED | OUTPATIENT
Start: 2025-04-22

## 2025-04-22 ASSESSMENT — PAIN SCALES - GENERAL: PAINLEVEL_OUTOF10: NO PAIN (0)

## 2025-04-22 NOTE — PROGRESS NOTES
"  Assessment & Plan     ADHD (attention deficit hyperactivity disorder), combined type    - amphetamine-dextroamphetamine (ADDERALL XR) 10 MG 24 hr capsule; Take 1 capsule (10 mg) by mouth every morning.    Patient is newly diagnosed with ADHD as per psychology assessment.  Recommending to start a stimulant medication.  Adderall XR 10 mg daily  ordered for her.  Side effect profile mechanism of action reviewed.  Instructed to notify me if any concerns noted with the medication use.  Follow-up in 3 to 4 weeks for recheck.  Patient is in agreement    At this time she is not in school therefore  see how she feels day today need at that time.  For now I recommended her to will be hard to assess that in future once the school starts how she would perform and we may need to adjust the dose based on  .  I suggested her to take on a Summer online class and see how she performs on that.  Patient will look into it      The longitudinal plan of care for the diagnosis(es)/condition(s) as documented were addressed during this visit. Due to the added complexity in care, I will continue to support Lupe in the subsequent management and with ongoing continuity of care.    BMI  Estimated body mass index is 35.59 kg/m  as calculated from the following:    Height as of this encounter: 1.753 m (5' 9\").    Weight as of this encounter: 109.3 kg (241 lb).             Jacy Andrade is a 19 year old, presenting for the following health issues:  Consult        4/22/2025     8:49 AM   Additional Questions   Roomed by Leon HURLEY     History of Present Illness       Reason for visit:  Adhd evaluation follow up    She eats 2-3 servings of fruits and vegetables daily.She consumes 1 sweetened beverage(s) daily.She exercises with enough effort to increase her heart rate 30 to 60 minutes per day.  She exercises with enough effort to increase her heart rate 5 days per week.   She is taking medications regularly.          Concern - Discuss " "ADHD    Patient recently had ADHD testing which confirmed the diagnosis.  She is here today to discuss medication management.  She had to drop out of school.  Plans to start her school again after her summer in the next school year.      Review of Systems  CONSTITUTIONAL: NEGATIVE for fever, chills, change in weight  ENT/MOUTH: NEGATIVE for ear, mouth and throat problems  RESP: NEGATIVE for significant cough or SOB  CV: NEGATIVE for chest pain, palpitations or peripheral edema      Objective    BP 98/70   Pulse 76   Temp 97.9  F (36.6  C) (Tympanic)   Resp 16   Ht 1.753 m (5' 9\")   Wt 109.3 kg (241 lb)   LMP 04/21/2025 (Exact Date)   SpO2 98%   BMI 35.59 kg/m    Body mass index is 35.59 kg/m .  Physical Exam   GENERAL: alert and no distress  RESP: lungs clear to auscultation - no rales, rhonchi or wheezes  CV: regular rate and rhythm, normal S1 S2, no S3 or S4, no murmur, click or rub, no peripheral edema  PSYCH: mentation appears normal, affect normal/bright            Signed Electronically by: Brendan Zacarias MD    "

## 2025-05-14 ENCOUNTER — VIRTUAL VISIT (OUTPATIENT)
Dept: FAMILY MEDICINE | Facility: CLINIC | Age: 20
End: 2025-05-14
Payer: COMMERCIAL

## 2025-05-14 DIAGNOSIS — F90.2 ADHD (ATTENTION DEFICIT HYPERACTIVITY DISORDER), COMBINED TYPE: ICD-10-CM

## 2025-05-14 PROCEDURE — 98005 SYNCH AUDIO-VIDEO EST LOW 20: CPT | Performed by: FAMILY MEDICINE

## 2025-05-14 RX ORDER — DEXTROAMPHETAMINE SACCHARATE, AMPHETAMINE ASPARTATE MONOHYDRATE, DEXTROAMPHETAMINE SULFATE AND AMPHETAMINE SULFATE 2.5; 2.5; 2.5; 2.5 MG/1; MG/1; MG/1; MG/1
10 CAPSULE, EXTENDED RELEASE ORAL EVERY MORNING
Qty: 30 CAPSULE | Refills: 0 | Status: CANCELLED | OUTPATIENT
Start: 2025-05-14

## 2025-05-14 NOTE — PROGRESS NOTES
"Lupe is a 19 year old who is being evaluated via a billable video visit.    How would you like to obtain your AVS? MyChart  If the video visit is dropped, the invitation should be resent by: Text to cell phone: [phone]  Will anyone else be joining your video visit? No      Assessment & Plan     ADHD (attention deficit hyperactivity disorder), combined type  Since she started using Adderall XR 10 mg in the morning, she has noticed decreased racing thoughts but it has not improved her ability to focus better and get her work done.  Denies any side effects.  She is able to sleep well at night.  No changes in appetite.  She would like to try higher dose.  Recommending to double up on the medication and use Adderall XR 20 mg in the morning.  She has 10 mg pills left that she will double up on to try this for the next few days and let me know how it works for her.  After that we will decide to go with the 20 mg dose or change it any further.  Patient agrees to the plan      The longitudinal plan of care for the diagnosis(es)/condition(s) as documented were addressed during this visit. Due to the added complexity in care, I will continue to support Lupe in the subsequent management and with ongoing continuity of care.        BMI  Estimated body mass index is 35.59 kg/m  as calculated from the following:    Height as of 4/22/25: 1.753 m (5' 9\").    Weight as of 4/22/25: 109.3 kg (241 lb).             Subjective   Lupe is a 19 year old, presenting for the following health issues:  Recheck Medication        5/14/2025    10:46 AM   Additional Questions   Roomed by Leon HURLEY       History of Present Illness       Reason for visit:  A follow up    She eats 2-3 servings of fruits and vegetables daily.She consumes 2 sweetened beverage(s) daily.She exercises with enough effort to increase her heart rate 30 to 60 minutes per day.  She exercises with enough effort to increase her heart rate 3 or less days per week. She is " missing 1 dose(s) of medications per week.  She is not taking prescribed medications regularly due to other.        Medication Followup of Adderall  Taking Medication as prescribed: yes  Side Effects:  None  Medication Helping Symptoms:  a little        Review of Systems  CONSTITUTIONAL: NEGATIVE for fever, chills, change in weight  ENT/MOUTH: NEGATIVE for ear, mouth and throat problems  RESP: NEGATIVE for significant cough or SOB  CV: NEGATIVE for chest pain, palpitations or peripheral edema      Objective           Vitals:  No vitals were obtained today due to virtual visit.    Physical Exam   GENERAL: alert and no distress  EYES: Eyes grossly normal to inspection.  No discharge or erythema, or obvious scleral/conjunctival abnormalities.  RESP: No audible wheeze, cough, or visible cyanosis.    SKIN: Visible skin clear. No significant rash, abnormal pigmentation or lesions.  NEURO: Cranial nerves grossly intact.  Mentation and speech appropriate for age.  PSYCH: Appropriate affect, tone, and pace of words          Video-Visit Details    Type of service:  Video Visit     Originating Location (pt. Location): Home    Distant Location (provider location):  On-site  Platform used for Video Visit: Hero  Signed Electronically by: Brendan Zacarias MD

## 2025-05-20 DIAGNOSIS — F90.2 ADHD (ATTENTION DEFICIT HYPERACTIVITY DISORDER), COMBINED TYPE: Primary | ICD-10-CM

## 2025-05-20 RX ORDER — LISDEXAMFETAMINE DIMESYLATE 20 MG/1
20 CAPSULE ORAL EVERY MORNING
Qty: 30 CAPSULE | Refills: 0 | Status: SHIPPED | OUTPATIENT
Start: 2025-05-20

## 2025-06-03 ENCOUNTER — OFFICE VISIT (OUTPATIENT)
Dept: FAMILY MEDICINE | Facility: CLINIC | Age: 20
End: 2025-06-03
Payer: COMMERCIAL

## 2025-06-03 VITALS
RESPIRATION RATE: 18 BRPM | HEIGHT: 69 IN | WEIGHT: 240 LBS | HEART RATE: 78 BPM | OXYGEN SATURATION: 96 % | TEMPERATURE: 97 F | BODY MASS INDEX: 35.55 KG/M2 | DIASTOLIC BLOOD PRESSURE: 68 MMHG | SYSTOLIC BLOOD PRESSURE: 108 MMHG

## 2025-06-03 DIAGNOSIS — Z00.00 ANNUAL PHYSICAL EXAM: Primary | ICD-10-CM

## 2025-06-03 DIAGNOSIS — F90.2 ADHD (ATTENTION DEFICIT HYPERACTIVITY DISORDER), COMBINED TYPE: ICD-10-CM

## 2025-06-03 DIAGNOSIS — E55.9 VITAMIN D DEFICIENCY: ICD-10-CM

## 2025-06-03 DIAGNOSIS — Z11.4 SCREENING FOR HIV (HUMAN IMMUNODEFICIENCY VIRUS): ICD-10-CM

## 2025-06-03 DIAGNOSIS — Z11.59 NEED FOR HEPATITIS C SCREENING TEST: ICD-10-CM

## 2025-06-03 LAB
HCV AB SERPL QL IA: NONREACTIVE
HIV 1+2 AB+HIV1 P24 AG SERPL QL IA: NONREACTIVE
VIT D+METAB SERPL-MCNC: 27 NG/ML (ref 20–50)

## 2025-06-03 PROCEDURE — 82306 VITAMIN D 25 HYDROXY: CPT | Performed by: FAMILY MEDICINE

## 2025-06-03 PROCEDURE — 1126F AMNT PAIN NOTED NONE PRSNT: CPT | Performed by: FAMILY MEDICINE

## 2025-06-03 PROCEDURE — 3074F SYST BP LT 130 MM HG: CPT | Performed by: FAMILY MEDICINE

## 2025-06-03 PROCEDURE — 99213 OFFICE O/P EST LOW 20 MIN: CPT | Mod: 25 | Performed by: FAMILY MEDICINE

## 2025-06-03 PROCEDURE — 87389 HIV-1 AG W/HIV-1&-2 AB AG IA: CPT | Performed by: FAMILY MEDICINE

## 2025-06-03 PROCEDURE — G2211 COMPLEX E/M VISIT ADD ON: HCPCS | Performed by: FAMILY MEDICINE

## 2025-06-03 PROCEDURE — 86803 HEPATITIS C AB TEST: CPT | Performed by: FAMILY MEDICINE

## 2025-06-03 PROCEDURE — 36415 COLL VENOUS BLD VENIPUNCTURE: CPT | Performed by: FAMILY MEDICINE

## 2025-06-03 PROCEDURE — 3078F DIAST BP <80 MM HG: CPT | Performed by: FAMILY MEDICINE

## 2025-06-03 PROCEDURE — 99395 PREV VISIT EST AGE 18-39: CPT | Performed by: FAMILY MEDICINE

## 2025-06-03 RX ORDER — LISDEXAMFETAMINE DIMESYLATE 10 MG/1
10 CAPSULE ORAL EVERY MORNING
Qty: 15 CAPSULE | Refills: 0 | Status: SHIPPED | OUTPATIENT
Start: 2025-06-03

## 2025-06-03 SDOH — HEALTH STABILITY: PHYSICAL HEALTH: ON AVERAGE, HOW MANY MINUTES DO YOU ENGAGE IN EXERCISE AT THIS LEVEL?: 30 MIN

## 2025-06-03 SDOH — HEALTH STABILITY: PHYSICAL HEALTH: ON AVERAGE, HOW MANY DAYS PER WEEK DO YOU ENGAGE IN MODERATE TO STRENUOUS EXERCISE (LIKE A BRISK WALK)?: 5 DAYS

## 2025-06-03 ASSESSMENT — SOCIAL DETERMINANTS OF HEALTH (SDOH): HOW OFTEN DO YOU GET TOGETHER WITH FRIENDS OR RELATIVES?: TWICE A WEEK

## 2025-06-03 ASSESSMENT — PAIN SCALES - GENERAL: PAINLEVEL_OUTOF10: NO PAIN (0)

## 2025-06-03 NOTE — PROGRESS NOTES
"Preventive Care Visit  [facility] Zacarias MD, Family Medicine  Con 3, 2025      Assessment & Plan     Annual physical exam      Screening for HIV (human immunodeficiency virus)    - HIV Antigen Antibody Combo; Future  - HIV Antigen Antibody Combo    Need for hepatitis C screening test    - Hepatitis C Screen Reflex to HCV RNA Quant and Genotype; Future  - Hepatitis C Screen Reflex to HCV RNA Quant and Genotype    ADHD (attention deficit hyperactivity disorder), combined type  Patient reports that since switching from Adderall to Vyvanse, she is no longer noticing side effects including appetite suppression and insomnia but it is not being very effective either.  Increasing the dose of Vyvanse from 20 to 30 mg daily.  She will notify me back on the outcomes in the next 7 to 10 days.  - lisdexamfetamine (VYVANSE) 10 MG capsule; Take 1 capsule (10 mg) by mouth every morning. Use 10 mg cap with the 20 mg cap to make it total of 30 mg per day.    Vitamin D deficiency   She is currently taking 5000 units of vitamin D daily.  Repeat labs or  - Vitamin D Deficiency; Future  - Vitamin D Deficiency      The longitudinal plan of care for the diagnosis(es)/condition(s) as documented were addressed during this visit. Due to the added complexity in care, I will continue to support Lupe in the subsequent management and with ongoing continuity of care.        BMI  Estimated body mass index is 35.92 kg/m  as calculated from the following:    Height as of this encounter: 1.741 m (5' 8.54\").    Weight as of this encounter: 108.9 kg (240 lb).             Jacy Andrade is a 19 year old, presenting for the following:  Physical (Is fasting )        6/3/2025    10:21 AM   Additional Questions   Roomed by Ozzy          HPI       ADHD follow-up.  She is currently on Vyvanse 20 mg daily.  She does not notice a huge benefit with concentration with the medication.  She is no longer noticing side " effects that she had with Adderall including insomnia and appetite suppression so she is happy about that but wonders if a higher dose would benefit her            6/3/2025   General Health   How would you rate your overall physical health? Good   Feel stress (tense, anxious, or unable to sleep) Only a little         6/3/2025   Nutrition   Three or more servings of calcium each day? Yes   Diet: Regular (no restrictions)   How many servings of fruit and vegetables per day? (!) 2-3   How many sweetened beverages each day? (!) 2         6/3/2025   Exercise   Days per week of moderate/strenous exercise 5 days   Average minutes spent exercising at this level 30 min         6/3/2025   Social Factors   Frequency of gathering with friends or relatives Twice a week         6/3/2025   Dental   Dentist two times every year? (!) NO           Today's PHQ-2 Score:       2/3/2025     4:43 PM   PHQ-2 ( 1999 [employer])   Q1: Little interest or pleasure in doing things 0   Q2: Feeling down, depressed or hopeless 0   PHQ-2 Score 0    Q1: Little interest or pleasure in doing things Not at all   Q2: Feeling down, depressed or hopeless Not at all   PHQ-2 Score 0       Patient-reported         6/3/2025   Substance Use   Alcohol more than 3/day or more than 7/wk Not Applicable   Do you use any other substances recreationally? No     Social History     Tobacco Use    Smoking status: Never     Passive exposure: Never    Smokeless tobacco: Never   Vaping Use    Vaping status: Never Used         History of abnormal Pap smear: No - under age 21, PAP not appropriate for age              No data to display                 Reviewed and updated as needed this visit by Provider                    Patient Active Problem List   Diagnosis    Inattention    ADHD (attention deficit hyperactivity disorder), combined type     No past surgical history on file.    Social History     Tobacco Use    Smoking status: Never     Passive exposure: Never    Smokeless  "tobacco: Never   Substance Use Topics    Alcohol use: Not on file     No family history on file.      Current Outpatient Medications   Medication Sig Dispense Refill    lisdexamfetamine (VYVANSE) 10 MG capsule Take 1 capsule (10 mg) by mouth every morning. Use 10 mg cap with the 20 mg cap to make it total of 30 mg per day. 15 capsule 0    lisdexamfetamine (VYVANSE) 20 MG capsule Take 1 capsule (20 mg) by mouth every morning. 30 capsule 0     No Known Allergies      Review of Systems  CONSTITUTIONAL: NEGATIVE for fever, chills, change in weight  INTEGUMENTARY/SKIN: NEGATIVE for worrisome rashes, moles or lesions  EYES: NEGATIVE for vision changes or irritation  ENT/MOUTH: NEGATIVE for ear, mouth and throat problems  RESP: NEGATIVE for significant cough or SOB  BREAST: NEGATIVE for masses, tenderness or discharge  CV: NEGATIVE for chest pain, palpitations or peripheral edema  GI: NEGATIVE for nausea, abdominal pain, heartburn, or change in bowel habits  : NEGATIVE for frequency, dysuria, or hematuria  MUSCULOSKELETAL: NEGATIVE for significant arthralgias or myalgia  NEURO: NEGATIVE for weakness, dizziness or paresthesias  ENDOCRINE: NEGATIVE for temperature intolerance, skin/hair changes  HEME: NEGATIVE for bleeding problems  PSYCHIATRIC: NEGATIVE for changes in mood or affect     Objective    Exam  /68 (BP Location: Right arm, Patient Position: Sitting, Cuff Size: Adult Large)   Pulse 78   Temp 97  F (36.1  C) (Temporal)   Resp 18   Ht 1.741 m (5' 8.54\")   Wt 108.9 kg (240 lb)   LMP 05/24/2025 (Approximate)   SpO2 96%   Breastfeeding No   BMI 35.92 kg/m     Estimated body mass index is 35.92 kg/m  as calculated from the following:    Height as of this encounter: 1.741 m (5' 8.54\").    Weight as of this encounter: 108.9 kg (240 lb).    Physical Exam  GENERAL: alert and no distress  EYES: Eyes grossly normal to inspection, PERRL and conjunctivae and sclerae normal  HENT: ear canals and TM's normal, " nose and mouth without ulcers or lesions  NECK: no adenopathy, no asymmetry, masses, or scars  RESP: lungs clear to auscultation - no rales, rhonchi or wheezes  CV: regular rate and rhythm, normal S1 S2, no S3 or S4, no murmur, click or rub, no peripheral edema  ABDOMEN: soft, nontender, no hepatosplenomegaly, no masses and bowel sounds normal  MS: no gross musculoskeletal defects noted, no edema  SKIN: no suspicious lesions or rashes  NEURO: Normal strength and tone, mentation intact and speech normal  PSYCH: mentation appears normal, affect normal/bright        Vision Screen  Vision Screen Details  Reason Vision Screen Not Completed: Screening Recommend: Patient/Guardian Declined    Hearing Screen  Hearing Screen Not Completed  Reason Hearing Screen was not completed: Parent declined - Preference        Signed Electronically by: Brendan Zacarias MD

## 2025-06-04 ENCOUNTER — RESULTS FOLLOW-UP (OUTPATIENT)
Dept: FAMILY MEDICINE | Facility: CLINIC | Age: 20
End: 2025-06-04

## 2025-08-20 ENCOUNTER — VIRTUAL VISIT (OUTPATIENT)
Dept: FAMILY MEDICINE | Facility: CLINIC | Age: 20
End: 2025-08-20
Payer: COMMERCIAL

## 2025-08-20 DIAGNOSIS — F90.2 ADHD (ATTENTION DEFICIT HYPERACTIVITY DISORDER), COMBINED TYPE: Primary | ICD-10-CM

## 2025-08-20 PROCEDURE — 98005 SYNCH AUDIO-VIDEO EST LOW 20: CPT | Performed by: FAMILY MEDICINE

## 2025-08-20 RX ORDER — METHYLPHENIDATE HYDROCHLORIDE 36 MG/1
36 TABLET ORAL EVERY MORNING
Qty: 30 TABLET | Refills: 0 | Status: SHIPPED | OUTPATIENT
Start: 2025-09-02